# Patient Record
Sex: MALE | Race: WHITE | Employment: STUDENT | URBAN - METROPOLITAN AREA
[De-identification: names, ages, dates, MRNs, and addresses within clinical notes are randomized per-mention and may not be internally consistent; named-entity substitution may affect disease eponyms.]

---

## 2017-04-10 ENCOUNTER — OFFICE VISIT (OUTPATIENT)
Dept: PEDIATRICS | Facility: CLINIC | Age: 22
End: 2017-04-10
Payer: COMMERCIAL

## 2017-04-10 VITALS
TEMPERATURE: 98.1 F | SYSTOLIC BLOOD PRESSURE: 118 MMHG | WEIGHT: 267.25 LBS | OXYGEN SATURATION: 96 % | BODY MASS INDEX: 34.3 KG/M2 | HEART RATE: 80 BPM | DIASTOLIC BLOOD PRESSURE: 62 MMHG | RESPIRATION RATE: 18 BRPM | HEIGHT: 74 IN

## 2017-04-10 DIAGNOSIS — R79.89 ELEVATED TSH: ICD-10-CM

## 2017-04-10 DIAGNOSIS — F41.8 MIXED ANXIETY DEPRESSIVE DISORDER: Primary | ICD-10-CM

## 2017-04-10 DIAGNOSIS — E66.9 OBESITY, UNSPECIFIED OBESITY SEVERITY, UNSPECIFIED OBESITY TYPE: ICD-10-CM

## 2017-04-10 PROCEDURE — 99214 OFFICE O/P EST MOD 30 MIN: CPT | Performed by: SPECIALIST

## 2017-04-10 ASSESSMENT — ANXIETY QUESTIONNAIRES
1. FEELING NERVOUS, ANXIOUS, OR ON EDGE: SEVERAL DAYS
3. WORRYING TOO MUCH ABOUT DIFFERENT THINGS: MORE THAN HALF THE DAYS
GAD7 TOTAL SCORE: 8
7. FEELING AFRAID AS IF SOMETHING AWFUL MIGHT HAPPEN: SEVERAL DAYS
2. NOT BEING ABLE TO STOP OR CONTROL WORRYING: SEVERAL DAYS
6. BECOMING EASILY ANNOYED OR IRRITABLE: MORE THAN HALF THE DAYS
5. BEING SO RESTLESS THAT IT IS HARD TO SIT STILL: NOT AT ALL

## 2017-04-10 ASSESSMENT — PATIENT HEALTH QUESTIONNAIRE - PHQ9: 5. POOR APPETITE OR OVEREATING: SEVERAL DAYS

## 2017-04-10 NOTE — MR AVS SNAPSHOT
"              After Visit Summary   4/10/2017    Morgan Coleman    MRN: [id number]           Patient Information     Date Of Birth          1995        Visit Information        Provider Department      4/10/2017 8:20 AM Chastity Condon MD [facility]        Today's Diagnoses     Mixed anxiety depressive disorder    -  1    Obesity, unspecified obesity severity, unspecified obesity type        Elevated TSH          Care Instructions    Wt Readings from Last 5 Encounters:   04/10/17 267 lb 4 oz (121.2 kg)   09/21/16 263 lb 9.6 oz (119.6 kg) (>99 %)*   10/30/15 252 lb 6.4 oz (114.5 kg) (>99 %)*   09/29/15 261 lb 4.8 oz (118.5 kg) (>99 %)*   02/19/15 242 lb 6.4 oz (110 kg) (>99 %)*     * Growth percentiles are based on [facility] 2-20 Years data.     Selective Serotonin Reuptake Inhibitors, or \"SSRIs\" are a group of medications that can help treat depression but are also helpful for anxiety. SSRIs alter the level of the neurotransmitter serotonin in the brain, which helps the brain cells communicate with one another.   Fluoxetine (Prozac), Sertraline (Zoloft), Citalopram (Celexa) and Escitalopram (Lexapro) are a few of the more common SSRIs. These medications are generally well tolerated but can produce some side effects when people first start to take and they usually fade over time. These can include some jitteriness, nausea, fatigue or sleep disturbance. If these side effects do not diminish with time or are bothersome, please call us. Occasionally they can be more severe, with increase irritability, earlene, worsening depression or feelings of want to harm oneself. If these should occur, you should call right away.   FDA Black Box warning- increased risk of suicide thinking but not in actual suicides.   Would recommend starting Prozac 20 mg daily. Will likely need higher dose.   Side effects can be minimized by starting at a low dose and gradually increase dose as needed. It can take 4-6 weeks " "before symptoms really start to improve.   Follow up visits are required within 3-4 weeks of starting medication and then will be need to be monitored regularly.   Will need recheck before any refills.   Would take daily Vitamin D supplements as well.           Follow-ups after your visit        Who to contact     If you have questions or need follow up information about today's clinic visit or your schedule please contact [facility] directly at [phone].  Normal or non-critical lab and imaging results will be communicated to you by [employer], letter or phone within 4 business days after the clinic has received the results. If you do not hear from us within 7 days, please contact the clinic through [employer] or phone. If you have a critical or abnormal lab result, we will notify you by phone as soon as possible.  Submit refill requests through [employer] or call your pharmacy and they will forward the refill request to us. Please allow 3 business days for your refill to be completed.          Additional Information About Your Visit        Listen [employer] Information     [employer] lets you send messages to your doctor, view your test results, renew your prescriptions, schedule appointments and more. To sign up, go to [email or web address] . Click on \"Log in\" on the left side of the screen, which will take you to the Welcome page. Then click on \"Sign up Now\" on the right side of the page.     You will be asked to enter the access code listed below, as well as some personal information. Please follow the directions to create your username and password.     Your access code is: [email or web address]  Expires: 2017  9:10 AM     Your access code will  in 90 days. If you need help or a new code, please call your [place] clinic or [phone].        Care EveryWhere ID     This is your Care EveryWhere ID. This could be used by other organizations to access your [place] medical records  [id number]        Your " "Vitals Were     Pulse Temperature Respirations Height Pulse Oximetry BMI (Body Mass Index)    80 98.1  F (36.7  C) (Tympanic) 18 6' 2.25\" (1.886 m) 96% 34.08 kg/m2       Blood Pressure from Last 3 Encounters:   04/10/17 118/62   09/21/16 102/62   10/30/15 106/64    Weight from Last 3 Encounters:   04/10/17 267 lb 4 oz (121.2 kg)   09/21/16 263 lb 9.6 oz (119.6 kg) (>99 %)*   10/30/15 252 lb 6.4 oz (114.5 kg) (>99 %)*     * Growth percentiles are based on [facility] 2-20 Years data.              Today, you had the following     No orders found for display         Today's Medication Changes          These changes are accurate as of: 4/10/17  9:10 AM.  If you have any questions, ask your nurse or doctor.               Start taking these medicines.        Dose/Directions    FLUoxetine 20 MG capsule   Commonly known as:  PROzac   Used for:  Mixed anxiety depressive disorder   Started by:  Chastity Condon MD        Dose:  20 mg   Take 1 capsule (20 mg) by mouth daily Recheck before any refills.   Quantity:  30 capsule   Refills:  0            Where to get your medicines      [facility] [address] [place] [id number]    Hours:  24-hours Phone:  [phone]     FLUoxetine 20 MG capsule                Primary Care Provider Office Phone # Fax #    Chastity Ambrocio [phone]       [facility] [address] [facility] [address]        Thank you!     Thank you for choosing [facility]  for your care. Our goal is always to provide you with excellent care. Hearing back from our patients is one way we can continue to improve our services. Please take a few minutes to complete the written survey that you may receive in the mail after your visit with us. Thank you!             Your Updated Medication List - Protect others around you: Learn how " to safely use, store and throw away your medicines at www.disposemymeds.org.          This list is accurate as of: 4/10/17  9:10 AM.  Always use your most recent med list.                   Brand Name Dispense Instructions for use    FLUoxetine 20 MG capsule    PROzac    30 capsule    Take 1 capsule (20 mg) by mouth daily Recheck before any refills.

## 2017-04-10 NOTE — PROGRESS NOTES
"  SUBJECTIVE:                                                    Morgan Coleman is a 21 year old male who presents to clinic today for the following health issues:      Depression   History of depression and ED visit with thoughts of self-harm 3/12- took Prozac from 3/12 thru fall 2012.     Status since last visit: Worsened     See PHQ-9 for current symptoms.  Other associated symptoms: None    Complicating factors:   Significant life event:  Yes-  Graduated College in May 2016 and is looking for a job   Current substance abuse:  None  Anxiety or Panic symptoms:  Yes-  Anxiety    PHQ-9  English PHQ-9   Any Language             Amount of exercise or physical activity: 2-3 days/week for an average of 45-60 minutes with a     Problems taking medications regularly: No    Medication side effects: none    Diet: regular (no restrictions)    Exercise: Goes to [employer] and sees a  twice weekly; LARP (live action role-playing game)    Sleep: Mom thinks that he has no problems falling asleep. Patient disagrees with this and thinks that he takes a long time to fall into deep sleep. Mom mentions he had restless leg syndrome as a child (legs were \"twitchy\") but patient was unaware of this and states that he has had no symptoms of this that he can recall.    Patient says that everything was okay until he got his first job. 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 It was 60 hours/week for 6 week period and didn't feel like he had adequate training.  did not tell workplace about his health issues. Mother states that he was crying and had a \"complete breakdown\" which he describes as a panic attack. Also, mom says that he said that \"he doesn't belong in this world\". Now, he has a  from a placement agency so he is looking for a part-time job. Mom says that there aren't any part-time welding jobs. All jobs are full-time with a lot of overtime required. He graduated from [employer] in May " "2016 and hasn't had success since then. Still working with same . Has a meeting this week with a new . Patient is considering [employer].     Also mentions that he has social anxiety. He plays guitar for a band and once was \"almost in tears\" when he had to play. He goes to a karon group on Sundays which he enjoys. However, mom says that he avoids \"new things\" and plays video games at home all day. He talks to people virtually through games but not face-to-face. Mom also mentions that he is very irritable.     Patient doesn't recall how he was feeling on Prozac in 2012.     Mom says that he is planning on seeing Mari.    Of note, mom mentions that she wants to revisit idea of seizures, but haven't seen any signs/symptoms of seizures. Mom hasn't noticed any abnormal eye movements or facial symptoms. EEG done in the 2006 and 2007 were normal. However, she states that he \"spaces out\" and is \"so off\" sometimes. She discussed this with her therapist who is not accepting new patients.         PROBLEMS TO ADD ON...  Mom mentions that she was reviewing medical records which noted a low T4. He had labs done on 9/21/16. TSH was 1.53 and T4 was 1.01 (normal). Also mentions that he was taking a vitamin D supplement but stopped taking it recently.   Patient states that he has always had a patch of area without hair below chin which is why he wants thyroid checked. It has always been like this. Father also has patches of skin without hair.       Family Hx  Mom- taking Cymbalta for depression. Took prozac in the past which didn't help with her fibromyalgia. Wellbutrin and Zoloft(visual disturbance) gave her headaches.   Father- taking Lexapro for depression and anxiety    Problem list and histories reviewed & adjusted, as indicated.  Additional history: as documented    Patient Active Problem List   Diagnosis     PDD (pervasive developmental disorder), active     Learning disability     Language disorder " "involving understanding and expression of language     ADHD, predominantly inattentive type     Seasonal allergic rhinitis     Tinnitus     Auditory processing disorder     Vitamin D deficiency     Family history of coronary artery disease, Normal Lipid Panel 2008     Health Care Home     Obesity     Mixed anxiety depressive disorder     Past Surgical History:   Procedure Laterality Date     ADENOIDECTOMY  1999     EXCISION OF NAIL FOLD, TOE  12/11    Dr. Haines- right toe     PE TUBES  1997 & 1999    X2     TONSILLECTOMY  4/09       Social History   Substance Use Topics     Smoking status: Never Smoker     Smokeless tobacco: Never Used     Alcohol use No     Family History   Problem Relation Age of Onset     Cardiovascular Paternal Grandmother 70     heart      Depression Mother      fibromyalgia     Obesity Mother      Depression Father      Anxiety Disorder Father            Reviewed and updated as needed this visit by clinical staff  Tobacco  Allergies  Meds  Problems  Med Hx  Surg Hx  Fam Hx  Soc Hx        Reviewed and updated as needed this visit by Provider  Meds  Fam Hx         ROS:  C: NEGATIVE for fever, chills, change in weight  E/M: NEGATIVE for ear, mouth and throat problems  R: NEGATIVE for significant cough or SOB  CV: NEGATIVE for chest pain, palpitations or peripheral edema  PSYCHIATRIC: POSITIVE for depression, anxiety and panic attack    This document serves as a record of the services and decisions personally performed and made by Chastity Ambrocio MD. It was created on his/her behalf by Ramon Brower, a trained medical scribe. The creation of this document is based the provider's statements to the medical scribe.  Scribcarlos Brower 8:38 AM, April 10, 2017    OBJECTIVE:                                                    /62 (BP Location: Right arm, Patient Position: Chair, Cuff Size: Adult Regular)  Pulse 80  Temp 98.1  F (36.7  C) (Tympanic)  Resp 18  Ht 6' 2.25\" (1.886 m) " " Wt 267 lb 4 oz (121.2 kg)  SpO2 96%  BMI 34.08 kg/m2  Body mass index is 34.08 kg/(m^2).  GENERAL:  Alert and interactive  EYES:  Normal extra-ocular movements.  PERRLA  EARS: Normal  PHARYNX: Normal  NECK: No adenopathy, no thyroid enlargement.   LUNGS:  Clear  HEART:  Normal rate and rhythm.  Normal S1 and S2.  No murmurs.  NEURO:  No tics or tremor.  Normal tone and strength. Normal gait and balance.      Diagnostic Test Results:  none      ASSESSMENT/PLAN:                                                            BMI:   Estimated body mass index is 34.08 kg/(m^2) as calculated from the following:    Height as of this encounter: 6' 2.25\" (1.886 m).    Weight as of this encounter: 267 lb 4 oz (121.2 kg).   Weight management plan: Discussed healthy diet and exercise guidelines and patient will follow up in 1 month in clinic to re-evaluate.      1. Mixed anxiety depressive disorder  PHQ-9 SCORE 2/25/2013 6/11/2013 4/10/2017   Total Score 8 7 -   Total Score - - 12     ISAMAR-7 SCORE 1/24/2012 5/29/2012 4/10/2017   Total Score 5 4 -   Total Score - - 8       We spent a significant amount of time today discussing patient's depression/anxiety which seems to have worsened with job search. Given his history of ASD, explained that social anxiety could be attributed to this as well. Strong family history. Patient and mother were unable to recall any issues with fluoxetine in past so decided to restart this. Will start with 20 mg daily, but explained that we may need to increase dose so important to return in one month for med check.   - FLUoxetine (PROZAC) 20 MG capsule; Take 1 capsule (20 mg) by mouth daily Recheck before any refills.  Dispense: 30 capsule; Refill: 0    Mom also had concerns about seizures. Episodes of inattention have been long standing.  Given patient's normal EEG in 2006 and 2007 and the fact that he has no signs or symptoms indicating seizures, likely not concerned about this. However, did offer a " referral to a neurologist. Mother declined.       2. Obesity, unspecified obesity severity, unspecified obesity type  Discussion re: eating habits, exercise, risk of heart disease, HTN give wt and previous elevated TG.     3. Elevated TSH  TSH and T4 were normal last September. Explained that we normally would not recheck this until it has been at least 1 year since last labs.   Encouraged patient to restart vitamin D supplement.         Follow-up: 1 month for med check with starting new meds.     The information in this document, created by the medical scribe for me, accurately reflects the services I personally performed and the decisions made by me. I have reviewed and approved this document for accuracy prior to leaving the patient care area.  Chastity Ambrocio MD  9:16 AM, 04/10/17    Chastity Ambrocio MD  [facility]

## 2017-04-10 NOTE — NURSING NOTE
"Chief Complaint   Patient presents with     Depression       Initial /62 (BP Location: Right arm, Patient Position: Chair, Cuff Size: Adult Regular)  Pulse 80  Temp 98.1  F (36.7  C) (Tympanic)  Resp 18  Ht 6' 2.25\" (1.886 m)  Wt 267 lb 4 oz (121.2 kg)  SpO2 96%  BMI 34.08 kg/m2 Estimated body mass index is 34.08 kg/(m^2) as calculated from the following:    Height as of this encounter: 6' 2.25\" (1.886 m).    Weight as of this encounter: 267 lb 4 oz (121.2 kg).  Medication Reconciliation: complete     Amelia Cristobal CMA      "

## 2017-04-10 NOTE — PATIENT INSTRUCTIONS
"Wt Readings from Last 5 Encounters:   04/10/17 267 lb 4 oz (121.2 kg)   09/21/16 263 lb 9.6 oz (119.6 kg) (>99 %)*   10/30/15 252 lb 6.4 oz (114.5 kg) (>99 %)*   09/29/15 261 lb 4.8 oz (118.5 kg) (>99 %)*   02/19/15 242 lb 6.4 oz (110 kg) (>99 %)*     * Growth percentiles are based on [facility] 2-20 Years data.     Selective Serotonin Reuptake Inhibitors, or \"SSRIs\" are a group of medications that can help treat depression but are also helpful for anxiety. SSRIs alter the level of the neurotransmitter serotonin in the brain, which helps the brain cells communicate with one another.   Fluoxetine (Prozac), Sertraline (Zoloft), Citalopram (Celexa) and Escitalopram (Lexapro) are a few of the more common SSRIs. These medications are generally well tolerated but can produce some side effects when people first start to take and they usually fade over time. These can include some jitteriness, nausea, fatigue or sleep disturbance. If these side effects do not diminish with time or are bothersome, please call us. Occasionally they can be more severe, with increase irritability, earlene, worsening depression or feelings of want to harm oneself. If these should occur, you should call right away.   FDA Black Box warning- increased risk of suicide thinking but not in actual suicides.   Would recommend starting Prozac 20 mg daily. Will likely need higher dose.   Side effects can be minimized by starting at a low dose and gradually increase dose as needed. It can take 4-6 weeks before symptoms really start to improve.   Follow up visits are required within 3-4 weeks of starting medication and then will be need to be monitored regularly.   Will need recheck before any refills.   Would take daily Vitamin D supplements as well.     "

## 2017-04-11 ASSESSMENT — PATIENT HEALTH QUESTIONNAIRE - PHQ9: SUM OF ALL RESPONSES TO PHQ QUESTIONS 1-9: 12

## 2017-04-11 ASSESSMENT — ANXIETY QUESTIONNAIRES: GAD7 TOTAL SCORE: 8

## 2017-05-08 ENCOUNTER — OFFICE VISIT (OUTPATIENT)
Dept: PEDIATRICS | Facility: CLINIC | Age: 22
End: 2017-05-08
Payer: COMMERCIAL

## 2017-05-08 VITALS
SYSTOLIC BLOOD PRESSURE: 118 MMHG | HEART RATE: 70 BPM | TEMPERATURE: 97.2 F | RESPIRATION RATE: 20 BRPM | WEIGHT: 263.13 LBS | BODY MASS INDEX: 33.77 KG/M2 | DIASTOLIC BLOOD PRESSURE: 80 MMHG | OXYGEN SATURATION: 96 % | HEIGHT: 74 IN

## 2017-05-08 DIAGNOSIS — F90.0 ADHD, PREDOMINANTLY INATTENTIVE TYPE: Primary | ICD-10-CM

## 2017-05-08 DIAGNOSIS — F41.8 MIXED ANXIETY DEPRESSIVE DISORDER: ICD-10-CM

## 2017-05-08 PROCEDURE — 99214 OFFICE O/P EST MOD 30 MIN: CPT | Performed by: SPECIALIST

## 2017-05-08 RX ORDER — DEXTROAMPHETAMINE SACCHARATE, AMPHETAMINE ASPARTATE MONOHYDRATE, DEXTROAMPHETAMINE SULFATE AND AMPHETAMINE SULFATE 2.5; 2.5; 2.5; 2.5 MG/1; MG/1; MG/1; MG/1
10 CAPSULE, EXTENDED RELEASE ORAL DAILY
Qty: 30 CAPSULE | Refills: 0 | Status: SHIPPED | OUTPATIENT
Start: 2017-05-08 | End: 2017-06-21

## 2017-05-08 ASSESSMENT — ANXIETY QUESTIONNAIRES
6. BECOMING EASILY ANNOYED OR IRRITABLE: MORE THAN HALF THE DAYS
2. NOT BEING ABLE TO STOP OR CONTROL WORRYING: SEVERAL DAYS
1. FEELING NERVOUS, ANXIOUS, OR ON EDGE: NOT AT ALL
7. FEELING AFRAID AS IF SOMETHING AWFUL MIGHT HAPPEN: NOT AT ALL
3. WORRYING TOO MUCH ABOUT DIFFERENT THINGS: SEVERAL DAYS
GAD7 TOTAL SCORE: 4
5. BEING SO RESTLESS THAT IT IS HARD TO SIT STILL: NOT AT ALL

## 2017-05-08 ASSESSMENT — PATIENT HEALTH QUESTIONNAIRE - PHQ9: 5. POOR APPETITE OR OVEREATING: NOT AT ALL

## 2017-05-08 NOTE — NURSING NOTE
"Chief Complaint   Patient presents with     Anxiety     Depression     Recheck Medication       Initial /80 (BP Location: Right arm, Patient Position: Chair, Cuff Size: Adult Regular)  Pulse 70  Temp 97.2  F (36.2  C) (Tympanic)  Resp 20  Ht 6' 2.25\" (1.886 m)  Wt 263 lb 2 oz (119.4 kg)  SpO2 96%  BMI 33.56 kg/m2 Estimated body mass index is 33.56 kg/(m^2) as calculated from the following:    Height as of this encounter: 6' 2.25\" (1.886 m).    Weight as of this encounter: 263 lb 2 oz (119.4 kg).  Medication Reconciliation: complete     Amelia Cristobal CMA      "

## 2017-05-08 NOTE — PATIENT INSTRUCTIONS
Wt Readings from Last 5 Encounters:   05/08/17 263 lb 2 oz (119.4 kg)   04/10/17 267 lb 4 oz (121.2 kg)   09/21/16 263 lb 9.6 oz (119.6 kg) (>99 %)*   10/30/15 252 lb 6.4 oz (114.5 kg) (>99 %)*   09/29/15 261 lb 4.8 oz (118.5 kg) (>99 %)*     * Growth percentiles are based on [facility] 2-20 Years data.     Will keep Prozac at 20 mg.   Starting ADHD medication:  Would recommend starting with stimulant medication. There are non-stimulant alternatives that would be considered if stimulants are not effective or if side effects are not tolerable. Most common side effects from stimulants include appetite reduction, weight loss, sleep difficulties, irritability, rebound effect as medication wears off, sedation, motor tics, emotional lability, headaches and stomachaches. To minimize side effects, we would normally start with lowest dose.   Would recommend you start with Adderall XR 10 mg.   After 3-5 days if not working very well but not having any side-effects from medications, then go ahead and increase the dose to 20 mg.   If you feel any further adjustments in dosing are needed before the recheck then please call.

## 2017-05-08 NOTE — PROGRESS NOTES
"  SUBJECTIVE:                                                    Morgan Coleman is a 21 year old male who presents to clinic today for the following health issues:        Depression and Anxiety Follow-Up    Last clinic visit: 4/10/17- Started on 20 mg of Prozac once daily.    Status since last visit: Improved. Anxiety has improved; patient states that it is not a problem anymore. Still a little irritable but attributes this to reactions to his family. Overall is happier    Mom and patient have concerns about possible inattention; this was discussed at last visit. Patient states that he has been \"spacing\" out. Diagnosed with ADHD when young but had done more brain training and had wanted to avoid stimulants. Mom is concerned about difficulty with filling prescription of stimulant and being able to take this on trips but willing to consider them.     Other associated symptoms: None    Side effects: None.     Sleep: No change. Takes a long time to fall asleep.     Physical activity: States he has been working out more often.     Complicating factors:     Significant life event: No     Current substance abuse: None    PHQ-9 SCORE 6/11/2013 4/10/2017 5/8/2017   Total Score 7 - -   Total Score - 12 9     ISAMAR-7 SCORE 5/29/2012 4/10/2017 5/8/2017   Total Score 4 - -   Total Score - 8 4        PHQ-9  English      PHQ-9   Any Language     GAD7       Amount of exercise or physical activity: 2-3 days/week for an average of 45-60 minutes with a  and does LARP (Live action Role-Playing)    Problems taking medications regularly: No    Medication side effects: none    Diet: regular (no restrictions)    Family Hx  Mom- taking Cymbalta for depression. Took prozac in the past which didn't help with her fibromyalgia. Wellbutrin and Zoloft(visual disturbance) gave her headaches.   Father- taking Lexapro for depression and anxiety. Recently diagnosed with form of autism also.  Also has ADHD- treated with a medication that has " dexamphetamine. Complaining of memory issues which is likely related to ADHD.     Problem list and histories reviewed & adjusted, as indicated.  Additional history: as documented    Patient Active Problem List   Diagnosis     PDD (pervasive developmental disorder), active     Learning disability     Language disorder involving understanding and expression of language     ADHD, predominantly inattentive type     Seasonal allergic rhinitis     Tinnitus     Auditory processing disorder     Vitamin D deficiency     Family history of coronary artery disease, Normal Lipid Panel 2008     Health Care Home     Obesity     Mixed anxiety depressive disorder     Past Surgical History:   Procedure Laterality Date     ADENOIDECTOMY  1999     EXCISION OF NAIL FOLD, TOE  12/11    Dr. Haines- right toe     PE TUBES  1997 & 1999    X2     TONSILLECTOMY  4/09       Social History   Substance Use Topics     Smoking status: Never Smoker     Smokeless tobacco: Never Used     Alcohol use No     Family History   Problem Relation Age of Onset     Cardiovascular Paternal Grandmother 70     heart      Depression Mother      fibromyalgia     Obesity Mother      Depression Father      Anxiety Disorder Father      Autism Spectrum Disorder Father      Attention Deficit Disorder Father      Bipolar Disorder Maternal Grandmother            Reviewed and updated as needed this visit by clinical staff  Tobacco  Allergies  Meds  Problems  Med Hx  Surg Hx  Fam Hx  Soc Hx        Reviewed and updated as needed this visit by Provider         ROS:  Constitutional, HEENT, cardiovascular, pulmonary, gi and gu systems are negative, except as otherwise noted.    This document serves as a record of the services and decisions personally performed and made by Chastity Ambrocio MD. It was created on his/her behalf by Ramon Brower, a trained medical scribe. The creation of this document is based the provider's statements to the medical scribe.  Scribe  "Ramon Brower 9:12 AM, May 8, 2017    OBJECTIVE:                                                    /80 (BP Location: Right arm, Patient Position: Chair, Cuff Size: Adult Regular)  Pulse 70  Temp 97.2  F (36.2  C) (Tympanic)  Resp 20  Ht 1.886 m (6' 2.25\")  Wt 119.4 kg (263 lb 2 oz)  SpO2 96%  BMI 33.56 kg/m2  Body mass index is 33.56 kg/(m^2).    GENERAL:  Alert and interactive  Physical exam deferred today.       Diagnostic Test Results:  none      ASSESSMENT/PLAN:                                                      1. Mixed anxiety depressive disorder  ISAMAR-7 SCORE 5/29/2012 4/10/2017 5/8/2017   Total Score 4 - -   Total Score - 8 4     PHQ-9 SCORE 6/11/2013 4/10/2017 5/8/2017   Total Score 7 - -   Total Score - 12 9     Improved. No side effects from medication. Patient and mom would like to stay with current dose. Refill needed.   - FLUoxetine (PROZAC) 20 MG capsule; Take 1 capsule (20 mg) by mouth daily Recheck before any refills.  Dispense: 30 capsule; Refill: 3    2. ADHD, predominantly inattentive type  Had discussed last time that \"spells\" unlikely seizures. Had previous EEG that did not show seizures. Patient and mom mainly concerned about inattention at this point and would like to start medication. Previous evaluations confirmed diagnosis of ADD inattentive type for him. Father is likely taking Adderall and sibling (not biologically related) was taking Strattera.  Had a discussion about stimulants vs non-stimulants. Will start with Adderall XR 10 mg daily. If not helping after a week, but tolerating well, can try increasing to 20 mg after 3-5 days. Would like him to let me know if he does increase dose.   - amphetamine-dextroamphetamine (ADDERALL XR) 10 MG per 24 hr capsule; Take 1 capsule (10 mg) by mouth daily Med check before any refills.  Dispense: 30 capsule; Refill: 0    Follow Up: Med recheck in one month. If adjustments are made prior to recheck, will call.     See Patient " Instructions for further details.     The information in this document, created by the medical scribe for me, accurately reflects the services I personally performed and the decisions made by me. I have reviewed and approved this document for accuracy prior to leaving the patient care area.    9:25 AM, 05/08/17    Chastity Ambrocio MD  [facility]

## 2017-05-08 NOTE — MR AVS SNAPSHOT
After Visit Summary   5/8/2017    Morgan Coleman    MRN: [id number]           Patient Information     Date Of Birth          1995        Visit Information        Provider Department      5/8/2017 9:00 AM Chastity Condon MD [facility]        Today's Diagnoses     ADHD, predominantly inattentive type    -  1    Mixed anxiety depressive disorder          Care Instructions    Wt Readings from Last 5 Encounters:   05/08/17 263 lb 2 oz (119.4 kg)   04/10/17 267 lb 4 oz (121.2 kg)   09/21/16 263 lb 9.6 oz (119.6 kg) (>99 %)*   10/30/15 252 lb 6.4 oz (114.5 kg) (>99 %)*   09/29/15 261 lb 4.8 oz (118.5 kg) (>99 %)*     * Growth percentiles are based on [facility] 2-20 Years data.     Will keep Prozac at 20 mg.   Starting ADHD medication:  Would recommend starting with stimulant medication. There are non-stimulant alternatives that would be considered if stimulants are not effective or if side effects are not tolerable. Most common side effects from stimulants include appetite reduction, weight loss, sleep difficulties, irritability, rebound effect as medication wears off, sedation, motor tics, emotional lability, headaches and stomachaches. To minimize side effects, we would normally start with lowest dose.   Would recommend you start with Adderall XR 10 mg.   After 3-5 days if not working very well but not having any side-effects from medications, then go ahead and increase the dose to 20 mg.   If you feel any further adjustments in dosing are needed before the recheck then please call.          Follow-ups after your visit        Who to contact     If you have questions or need follow up information about today's clinic visit or your schedule please contact [facility] directly at [phone].  Normal or non-critical lab and imaging results will be communicated to you by MyChart, letter or phone within 4 business days after the clinic has received the results. If you do  "not hear from us within 7 days, please contact the clinic through [facility] or phone. If you have a critical or abnormal lab result, we will notify you by phone as soon as possible.  Submit refill requests through [facility] or call your pharmacy and they will forward the refill request to us. Please allow 3 business days for your refill to be completed.          Additional Information About Your Visit        [email or web address] Information     [facility] lets you send messages to your doctor, view your test results, renew your prescriptions, schedule appointments and more. To sign up, go to [email or web address] . Click on \"Log in\" on the left side of the screen, which will take you to the Welcome page. Then click on \"Sign up Now\" on the right side of the page.     You will be asked to enter the access code listed below, as well as some personal information. Please follow the directions to create your username and password.     Your access code is: [email or web address]  Expires: 2017  9:10 AM     Your access code will  in 90 days. If you need help or a new code, please call your [place] clinic or [phone].        Care EveryWhere ID     This is your Care EveryWhere ID. This could be used by other organizations to access your [place] medical records  [id number]        Your Vitals Were     Pulse Temperature Respirations Height Pulse Oximetry BMI (Body Mass Index)    70 97.2  F (36.2  C) (Tympanic) 20 6' 2.25\" (1.886 m) 96% 33.56 kg/m2       Blood Pressure from Last 3 Encounters:   17 118/80   04/10/17 118/62   16 102/62    Weight from Last 3 Encounters:   17 263 lb 2 oz (119.4 kg)   04/10/17 267 lb 4 oz (121.2 kg)   16 263 lb 9.6 oz (119.6 kg) (>99 %)*     * Growth percentiles are based on [facility] 2-20 Years data.              Today, you had the following     No orders found for display         Today's Medication Changes          These changes are accurate as of: 17  9:23 AM.  If you have any " questions, ask your nurse or doctor.               Start taking these medicines.        Dose/Directions    amphetamine-dextroamphetamine 10 MG per 24 hr capsule   Commonly known as:  ADDERALL XR   Used for:  ADHD, predominantly inattentive type   Started by:  Chastity Condon MD        Dose:  10 mg   Take 1 capsule (10 mg) by mouth daily Med check before any refills.   Quantity:  30 capsule   Refills:  0            Where to get your medicines      These medications were sent to [email or web address] [address] [facility] [address] AT [place] [address]  [address] [facility] [address]    Hours:  24-hours Phone:  [phone]     FLUoxetine 20 MG capsule         Some of these will need a paper prescription and others can be bought over the counter.  Ask your nurse if you have questions.     Bring a paper prescription for each of these medications     amphetamine-dextroamphetamine 10 MG per 24 hr capsule                Primary Care Provider Office Phone # Fax #    Chastity Ambrocio [phone]       [facility] [address] [facility] [address]        Thank you!     Thank you for choosing [facility]  for your care. Our goal is always to provide you with excellent care. Hearing back from our patients is one way we can continue to improve our services. Please take a few minutes to complete the written survey that you may receive in the mail after your visit with us. Thank you!             Your Updated Medication List - Protect others around you: Learn how to safely use, store and throw away your medicines at www.disposemymeds.org.          This list is accurate as of: 5/8/17  9:23 AM.  Always use your most recent med list.                   Brand Name Dispense Instructions for use    amphetamine-dextroamphetamine 10 MG per 24 hr capsule    ADDERALL XR    30 capsule    Take 1 capsule (10 mg) by mouth daily Med check before  any refills.       FLUoxetine 20 MG capsule    PROzac    30 capsule    Take 1 capsule (20 mg) by mouth daily Recheck before any refills.

## 2017-05-09 ASSESSMENT — ANXIETY QUESTIONNAIRES: GAD7 TOTAL SCORE: 4

## 2017-05-09 ASSESSMENT — PATIENT HEALTH QUESTIONNAIRE - PHQ9: SUM OF ALL RESPONSES TO PHQ QUESTIONS 1-9: 9

## 2017-05-22 ENCOUNTER — TELEPHONE (OUTPATIENT)
Dept: PEDIATRICS | Facility: CLINIC | Age: 22
End: 2017-05-22

## 2017-05-22 NOTE — TELEPHONE ENCOUNTER
Mom calls.  Pt hit his head yesterday.  One of his pupil is slightly larger than the other one, she is not sure if it was like that before.  He has a slight headache.  He hit the back of his head on a wooden floor.  He was playing a game and it was slippery and he fell.  He did not stop playing the game.  He came home and was tired and went right to bed.  He usually does this.  No confusion or nausea.  No LOC.  No dizziness or blurry vision.  No ringing in the ears.  She is concerned because he has autism and does not want any more brain injuries.      Advised to be seen.  Hours and locations of UC given.

## 2017-06-21 ENCOUNTER — OFFICE VISIT (OUTPATIENT)
Dept: PEDIATRICS | Facility: CLINIC | Age: 22
End: 2017-06-21
Payer: COMMERCIAL

## 2017-06-21 VITALS
TEMPERATURE: 98.4 F | HEIGHT: 74 IN | SYSTOLIC BLOOD PRESSURE: 118 MMHG | BODY MASS INDEX: 32.49 KG/M2 | RESPIRATION RATE: 20 BRPM | OXYGEN SATURATION: 97 % | HEART RATE: 85 BPM | DIASTOLIC BLOOD PRESSURE: 70 MMHG | WEIGHT: 253.19 LBS

## 2017-06-21 DIAGNOSIS — E66.9 OBESITY, UNSPECIFIED OBESITY SEVERITY, UNSPECIFIED OBESITY TYPE: ICD-10-CM

## 2017-06-21 DIAGNOSIS — F41.8 MIXED ANXIETY DEPRESSIVE DISORDER: ICD-10-CM

## 2017-06-21 DIAGNOSIS — F90.0 ADHD, PREDOMINANTLY INATTENTIVE TYPE: Primary | ICD-10-CM

## 2017-06-21 PROCEDURE — 99214 OFFICE O/P EST MOD 30 MIN: CPT | Performed by: SPECIALIST

## 2017-06-21 RX ORDER — DEXTROAMPHETAMINE SACCHARATE, AMPHETAMINE ASPARTATE, DEXTROAMPHETAMINE SULFATE AND AMPHETAMINE SULFATE 2.5; 2.5; 2.5; 2.5 MG/1; MG/1; MG/1; MG/1
10 TABLET ORAL 2 TIMES DAILY
Qty: 60 TABLET | Refills: 0 | Status: SHIPPED | OUTPATIENT
Start: 2017-06-21 | End: 2017-08-31

## 2017-06-21 NOTE — NURSING NOTE
"Chief Complaint   Patient presents with     A.D.H.D     Recheck Medication       Initial /70 (BP Location: Right arm, Patient Position: Chair, Cuff Size: Adult Regular)  Pulse 85  Temp 98.4  F (36.9  C) (Tympanic)  Resp 20  Ht 6' 2.25\" (1.886 m)  Wt 253 lb 3 oz (114.8 kg)  SpO2 97%  BMI 32.29 kg/m2 Estimated body mass index is 32.29 kg/(m^2) as calculated from the following:    Height as of this encounter: 6' 2.25\" (1.886 m).    Weight as of this encounter: 253 lb 3 oz (114.8 kg).  Medication Reconciliation: haja Cristobal CMA      "

## 2017-06-21 NOTE — MR AVS SNAPSHOT
"              After Visit Summary   6/21/2017    Morgan Coleman    MRN: [id number]           Patient Information     Date Of Birth          1995        Visit Information        Provider Department      6/21/2017 2:00 PM Chastity Condon MD [facility]        Today's Diagnoses     ADHD, predominantly inattentive type    -  1    Mixed anxiety depressive disorder          Care Instructions    Would want to see you back before need next refill- 1-2 mos depending on how you end up taking this.             Follow-ups after your visit        Who to contact     If you have questions or need follow up information about today's clinic visit or your schedule please contact [facility] directly at [phone].  Normal or non-critical lab and imaging results will be communicated to you by MyChart, letter or phone within 4 business days after the clinic has received the results. If you do not hear from us within 7 days, please contact the clinic through MyChart or phone. If you have a critical or abnormal lab result, we will notify you by phone as soon as possible.  Submit refill requests through [facility] or call your pharmacy and they will forward the refill request to us. Please allow 3 business days for your refill to be completed.          Additional Information About Your Visit        MyChart Information     [facility] lets you send messages to your doctor, view your test results, renew your prescriptions, schedule appointments and more. To sign up, go to [email or web address] . Click on \"Log in\" on the left side of the screen, which will take you to the Welcome page. Then click on \"Sign up Now\" on the right side of the page.     You will be asked to enter the access code listed below, as well as some personal information. Please follow the directions to create your username and password.     Your access code is: [email or web address]  Expires: 7/9/2017  9:10 AM     Your access code will " " in 90 days. If you need help or a new code, please call your [place] clinic or [phone].        Care EveryWhere ID     This is your Care EveryWhere ID. This could be used by other organizations to access your [place] medical records  [id number]        Your Vitals Were     Pulse Temperature Respirations Height Pulse Oximetry BMI (Body Mass Index)    85 98.4  F (36.9  C) (Tympanic) 20 6' 2.25\" (1.886 m) 97% 32.29 kg/m2       Blood Pressure from Last 3 Encounters:   17 118/70   17 118/80   04/10/17 118/62    Weight from Last 3 Encounters:   17 253 lb 3 oz (114.8 kg)   17 263 lb 2 oz (119.4 kg)   04/10/17 267 lb 4 oz (121.2 kg)              Today, you had the following     No orders found for display         Today's Medication Changes          These changes are accurate as of: 17  2:29 PM.  If you have any questions, ask your nurse or doctor.               Start taking these medicines.        Dose/Directions    amphetamine-dextroamphetamine 10 MG per tablet   Commonly known as:  ADDERALL   Used for:  ADHD, predominantly inattentive type   Replaces:  amphetamine-dextroamphetamine 10 MG per 24 hr capsule   Started by:  Chastity Condon MD        Dose:  10 mg   Take 1 tablet (10 mg) by mouth 2 times daily   Quantity:  60 tablet   Refills:  0         These medicines have changed or have updated prescriptions.        Dose/Directions    * FLUoxetine 20 MG capsule   Commonly known as:  PROzac   This may have changed:  Another medication with the same name was added. Make sure you understand how and when to take each.   Used for:  Mixed anxiety depressive disorder   Changed by:  Chastity Condon MD        Dose:  20 mg   Take 1 capsule (20 mg) by mouth daily Recheck before any refills.   Quantity:  30 capsule   Refills:  0       * FLUoxetine 20 MG capsule   Commonly known as:  PROzac   This may have changed:  You were already taking a medication with the same name, and " this prescription was added. Make sure you understand how and when to take each.   Used for:  Mixed anxiety depressive disorder   Changed by:  Chastity Condon MD        Dose:  20 mg   Take 1 capsule (20 mg) by mouth daily   Quantity:  90 capsule   Refills:  1       * Notice:  This list has 2 medication(s) that are the same as other medications prescribed for you. Read the directions carefully, and ask your doctor or other care provider to review them with you.      Stop taking these medicines if you haven't already. Please contact your care team if you have questions.     amphetamine-dextroamphetamine 10 MG per 24 hr capsule   Commonly known as:  ADDERALL XR   Replaced by:  amphetamine-dextroamphetamine 10 MG per tablet   Stopped by:  Chastity Condon MD                Where to get your medicines      These medications were sent to [facility] - [address], [facility] [address]     Phone:  [phone]     FLUoxetine 20 MG capsule         These medications [facility] to [facility] [address] [place] [address]  [id number] [place] [address]    Hours:  24-hours Phone:  [phone]     FLUoxetine 20 MG capsule         Some of these will need a paper prescription and others can be bought over the counter.  Ask your nurse if you have questions.     Bring a paper prescription for each of these medications     amphetamine-dextroamphetamine 10 MG per tablet                Primary Care Provider Office Phone # Fax #    Chastity Ambrocio [phone]       [facility] [address] [facility] [address]        Equal Access to Services     [facility]: Hadii aad ku hadasho Soomaali, waaxda luqadaha, qaybta kaalmada dejuan, raoul polanco. So [facility] [phone].    ATENCIÓN: lalita Alvarez  disposición servicios gratuitos de asistencia lingüística. Shayna addison [phone].    We comply with applicable federal civil rights laws and [place] laws. We do not discriminate on the basis of race, color, national origin, age, disability sex, sexual orientation or gender identity.            Thank you!     Thank you for choosing [facility]  for your care. Our goal is always to provide you with excellent care. Hearing back from our patients is one way we can continue to improve our services. Please take a few minutes to complete the written survey that you may receive in the mail after your visit with us. Thank you!             Your Updated Medication List - Protect others around you: Learn how to safely use, store and throw away your medicines at www.disposemymeds.org.          This list is accurate as of: 6/21/17  2:29 PM.  Always use your most recent med list.                   Brand Name Dispense Instructions for use Diagnosis    amphetamine-dextroamphetamine 10 MG per tablet    ADDERALL    60 tablet    Take 1 tablet (10 mg) by mouth 2 times daily    ADHD, predominantly inattentive type       * FLUoxetine 20 MG capsule    PROzac    30 capsule    Take 1 capsule (20 mg) by mouth daily Recheck before any refills.    Mixed anxiety depressive disorder       * FLUoxetine 20 MG capsule    PROzac    90 capsule    Take 1 capsule (20 mg) by mouth daily    Mixed anxiety depressive disorder       * Notice:  This list has 2 medication(s) that are the same as other medications prescribed for you. Read the directions carefully, and ask your doctor or other care provider to review them with you.

## 2017-08-31 DIAGNOSIS — F90.0 ADHD, PREDOMINANTLY INATTENTIVE TYPE: ICD-10-CM

## 2017-08-31 NOTE — TELEPHONE ENCOUNTER
Adderall      Last Written Prescription Date:  6/21/17  Last Fill Quantity: 60,   # refills: 0  Last Office Visit with [facility] prescribing provider: 6/21/17  Future Office visit:       Routing refill request to provider for review/approval because:  Drug not on the [facility] or [facility] refill protocol or controlled substance    Patient's mother would like to pick prescription up asap. Please call [phone] when ready.

## 2017-09-01 RX ORDER — DEXTROAMPHETAMINE SACCHARATE, AMPHETAMINE ASPARTATE, DEXTROAMPHETAMINE SULFATE AND AMPHETAMINE SULFATE 2.5; 2.5; 2.5; 2.5 MG/1; MG/1; MG/1; MG/1
10 TABLET ORAL 2 TIMES DAILY
Qty: 60 TABLET | Refills: 0 | Status: SHIPPED | OUTPATIENT
Start: 2017-09-01 | End: 2017-12-04

## 2017-09-27 ENCOUNTER — DOCUMENTATION ONLY (OUTPATIENT)
Dept: PEDIATRICS | Facility: CLINIC | Age: 22
End: 2017-09-27

## 2017-09-27 NOTE — PROGRESS NOTES
Please call and let know he will need either an office visit, an e-visit or phone visit before next refill of Adderall can be done.

## 2017-09-27 NOTE — PROGRESS NOTES
Pediatric Panel Management Review      Patient has the following on his problem list:      ADHD (ages 6-12)  Review Medication Prescription dates:              Is this the first RX date?   NO - When was the previous RX date?  8/31/17  (if more than 120 days then a 30 day follow up is still needed)  Review Quality Dashboard or scorecard for index dates for patient.      Summary:    Patient is due/failing the following:   ADHD Medication Check.    Action needed:   Routed to provider for review.    Type of outreach:    None, routed to provider for review    Questions for provider review:    None.                                                                                                                                    Amelia Cristobal [facility]     Chart routed to Provider .

## 2017-10-17 ENCOUNTER — OFFICE VISIT (OUTPATIENT)
Dept: PEDIATRICS | Facility: CLINIC | Age: 22
End: 2017-10-17
Payer: COMMERCIAL

## 2017-10-17 VITALS
DIASTOLIC BLOOD PRESSURE: 78 MMHG | SYSTOLIC BLOOD PRESSURE: 118 MMHG | HEIGHT: 74 IN | RESPIRATION RATE: 20 BRPM | HEART RATE: 66 BPM | OXYGEN SATURATION: 97 % | WEIGHT: 258.8 LBS | TEMPERATURE: 98.1 F | BODY MASS INDEX: 33.21 KG/M2

## 2017-10-17 DIAGNOSIS — F41.8 MIXED ANXIETY DEPRESSIVE DISORDER: ICD-10-CM

## 2017-10-17 DIAGNOSIS — Z23 NEED FOR PROPHYLACTIC VACCINATION AND INOCULATION AGAINST INFLUENZA: ICD-10-CM

## 2017-10-17 DIAGNOSIS — F90.0 ADHD, PREDOMINANTLY INATTENTIVE TYPE: Primary | ICD-10-CM

## 2017-10-17 PROCEDURE — 99214 OFFICE O/P EST MOD 30 MIN: CPT | Mod: 25 | Performed by: SPECIALIST

## 2017-10-17 PROCEDURE — 90686 IIV4 VACC NO PRSV 0.5 ML IM: CPT | Performed by: SPECIALIST

## 2017-10-17 PROCEDURE — 90471 IMMUNIZATION ADMIN: CPT | Performed by: SPECIALIST

## 2017-10-17 RX ORDER — FLUOXETINE 10 MG/1
10 CAPSULE ORAL DAILY
Qty: 30 CAPSULE | Refills: 2 | Status: SHIPPED | OUTPATIENT
Start: 2017-10-17 | End: 2018-01-10

## 2017-10-17 RX ORDER — DEXTROAMPHETAMINE SACCHARATE, AMPHETAMINE ASPARTATE MONOHYDRATE, DEXTROAMPHETAMINE SULFATE AND AMPHETAMINE SULFATE 3.75; 3.75; 3.75; 3.75 MG/1; MG/1; MG/1; MG/1
15 CAPSULE, EXTENDED RELEASE ORAL DAILY
Qty: 30 CAPSULE | Refills: 0 | Status: SHIPPED | OUTPATIENT
Start: 2017-10-17 | End: 2017-12-04

## 2017-10-17 ASSESSMENT — ENCOUNTER SYMPTOMS: NERVOUS/ANXIOUS: 1

## 2017-10-17 NOTE — PATIENT INSTRUCTIONS
Prozac- important to try to take regularly. This is a long acting medication. Will increase dose to 30 mg (take one of the 10 mg along with one of the 20 mg= 30 mg) each day at same time. No reason to divide up.     Adderall XR 15 mg- take in am and should last longer. If not taking until later in the day, take the Adderall 10 mg short acting instead.     Would use facial moisturizer like Cetaphil or Vanicream for dry patch on left eyelid.     Transition to an adult provider. Recommend scheduling check up in next few months.   Review Transition work sheet with parents.   See if they can help you get into MyChart to schedule appt, see your medical records, etc.   If you can't get in, there is help line you can call.   [facility]: Argelia Cook, Leonides Atwood or [facility]: Many Med/Peds providers who could see you  Would like you to let me know who you want to see so I can talk with them before your next appt.     Consider HPV series.

## 2017-10-17 NOTE — PROGRESS NOTES
SUBJECTIVE:                                                    Morgan Coleman is a 21 year old male who presents to clinic today with mother in ayan because of:    PHQ-2 Score: Incomplete  Chief Complaint   Patient presents with     Recheck Medication     A.D.H.D     Anxiety     Depression      Diet:  Regular (no restrictions)  Frequency of exercise:  2-3 days/week  Duration of exercise:  15-30 minutes  Taking medications regularly:  Yes  Medication side effects:  None  Additional concerns today:  No  Physical   Annual:     Getting at least 3 servings of Calcium per day::  NO    Bi-annual eye exam::  Yes    Dental care twice a year::  Yes    Sleep apnea or symptoms of sleep apnea::  Daytime drowsiness    Diet::  Regular (no restrictions)    Frequency of exercise::  2-3 days/week    Duration of exercise::  15-30 minutes    Taking medications regularly::  Yes    Medication side effects::  None    Additional concerns today::  No    Answers for HPI/ROS submitted by the patient on 10/17/2017     HPI  Depression/Anxiety Follow-Up  On Prozac 20 mg     Status since last visit: Rx helping anxiety but is wearing off by 6 pm, anxiety exacerbates when rx wears off. Forgets to take rx 1-3x weekly. Morgan d/c rx for 1 month because he didn't have a job and wasn't in school.    See PHQ-9 for current symptoms.    Other associated symptoms: None    Complicating factors:   Significant life event: No   Current substance abuse: None  Anxiety / Panic symptoms: Yes   PHQ-9  English PHQ-9   Any Language        ADHD Follow-Up  Started on Adderall XR 5/17  Date of last ADHD office visit: 6/21/2017- switched to short acting Adderall as he thought XR was lasting longer than needed.   Status since last visit: Improving but not lasting long enough now that Fransisca is taking classes again- falls asleep in last class.  Taking controlled (daily) medications as prescribed: Yes.                                                                             ADHD Medication     Amphetamines Disp Start End    amphetamine-dextroamphetamine (ADDERALL) 10 MG per tablet 60 tablet 9/1/2017     Sig - Route: Take 1 tablet (10 mg) by mouth 2 times daily Med check before further refills. 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   Sleep: Improving  Activites: plays on computer, goes camping, runs. If he doesn't have anything to do he falls asleep.  Home/Family Concerns: None  Peer Concerns: None  Co-Morbid Diagnosis: PDD/ASD    ROS  Negative for constitutional, eye, ear, nose, throat, skin, respiratory, cardiac, and gastrointestinal other than those outlined in the HPI.    PROBLEM LIST  Patient Active Problem List    Diagnosis Date Noted     Mixed anxiety depressive disorder 04/10/2017     Priority: Medium     3/12- 9/12 Prozac  4/17 restarted Prozac       Obesity 10/09/2014     Priority: Medium     Health Care Home 01/24/2012     Priority: Medium     See note 10/9/14 for care plan - summary of history       Seasonal allergic rhinitis 11/14/2011     Priority: Medium     11/08 Intradermal skin tests + Allergy to dust mites, cats, grass, pollen; sublingual immunotherapy [id number] intermittent use; [facility]. [facility] in [place]       Tinnitus 11/14/2011     Priority: Medium     Audiogram and ENT evaluation felt to be within normal 9/08- Dr. Chavarria @ [facility].        Auditory processing disorder 11/14/2011     Priority: Medium     9/02 [facility]       Vitamin D deficiency 11/14/2011     Priority: Medium     Level 27; 8/13: 3/11 Vit D 14, B12 484,  Vit A 0.43, Retinyl Palmitate 0.04, Zinc 71, Ferritin 20, Ceruloplasmin 22, Copper 122, nl CBC, TSH 2.21, Free T3 4.1, Free T4 0.89       Family history of coronary artery disease, Normal Lipid Panel 2008 11/14/2011     Priority: Medium     Chol 151, TG 51, LDL 93, HDL 48       PDD  (pervasive developmental disorder), active      Priority: Medium     Followed DAN (Defeat Autism Now) Protocols thru Dr. Walker in [place], Dr. Taya Stockton in [place], Mercury chelation therapy with [facility] 7/01;  Dr. Axel Bernal @ [facility]; [facility]MS[facility] Chelation therapy, B12 shots, Vitamin Supplements, [place] Thyroid supplement; Gluten/ Casein- free diet; [facility] Eval 8/05; [facility] Social Skills Therapy, Music therapy, [facility]       Learning disability      Priority: Medium     Brain MRI normal 8/06; Auditory Processing Therapy, Attention Training Therapy, Vision Therapy @ [facility]- 26 weeks 12/06       Language disorder involving understanding and expression of language      Priority: Medium     2/05 Dr. Joaquin- Primarily Expressive Language; 8/05 [facility]. Has done speech therapy at [facility]       ADHD, predominantly inattentive type      Priority: Medium     EEG normal 8/06 & 10/07 for Brain training 6/08 (27 sessions)  5/17- Adderall        MEDICATIONS  Current Outpatient Prescriptions   Medication Sig Dispense Refill     amphetamine-dextroamphetamine (ADDERALL) 10 MG per tablet Take 1 tablet (10 mg) by mouth 2 times daily Med check before further refills. 60 tablet 0     FLUoxetine (PROZAC) 20 MG capsule Take 1 capsule (20 mg) by mouth daily Recheck before any refills. 30 capsule 0     [DISCONTINUED] FLUoxetine (PROZAC) 20 MG capsule Take 1 capsule (20 mg) by mouth daily 90 capsule 1      ALLERGIES  No Known Allergies    Reviewed and updated as needed this visit by clinical staff  Tobacco  Allergies  Meds  Problems  Med Hx  Surg Hx  Fam Hx  Soc Hx        Reviewed and updated as needed this visit by Provider  Allergies  Meds  Problems        This document serves as a record of the services and decisions personally performed and made by Chastity Ambrocio MD. It was created on her behalf by Leonid Zamora, a trained medical scribe. The creation of this  "document is based the provider's statements to the medical scribe.  Scribcarlos Zamora 2:12 PM, October 17, 2017    OBJECTIVE:                                                    /78 (BP Location: Right arm, Patient Position: Chair, Cuff Size: Adult Regular)  Pulse 66  Temp 98.1  F (36.7  C) (Tympanic)  Resp 20  Ht 1.88 m (6' 2\")  Wt 117.4 kg (258 lb 12.8 oz)  SpO2 97%  BMI 33.23 kg/m2  Normalized stature-for-age data not available for patients older than 20 years.  Normalized weight-for-age data not available for patients older than 20 years.  Normalized BMI data available only for age 0 to 20 years.  Normalized stature-for-age data not available for patients older than 20 years.    GENERAL:  Alert and interactive  SKIN: slightly dry patch on L eyelid  EYES:  Normal extra-ocular movements.  PERRLA  EARS: Normal  PHARYNX: Normal  NECK: No adenopathy, no thyroid enlargement.   LUNGS:  Clear  HEART:  Normal rate and rhythm.  Normal S1 and S2.  No murmurs.  NEURO:  No tics or tremor.  Normal tone and strength. Normal gait and balance.    DIAGNOSTICS: None    ASSESSMENT/PLAN:                                                    1. ADHD, predominantly inattentive type  Now that he is back in school, wants to go back to XR formulation but thinks he needs a little higher dosing.   Take in AM and should last longer. If not taking until later in the day, take the Adderall 10 mg short acting instead (still has some of these)  - amphetamine-dextroamphetamine (ADDERALL XR) 15 MG per 24 hr capsule; Take 1 capsule (15 mg) by mouth daily  Dispense: 30 capsule;   Only given one month as I would like him to send me a note as to how this is working and can refill this if ok for a couple more months.    2. Mixed anxiety depressive disorder  Having some anxiety and trouble controlling emotions- feels worse later in day. Discussed that this is a longer acting medication and not something to repeat again later in day, " "rather increase in dose might be helpful.   Important to try to take regularly. Will increase dose to 30 mg (take one of the 10 mg along with one of the 20 mg= 30 mg) each day at same time.      - FLUoxetine (PROZAC) 20 MG capsule; Take 1 capsule (20 mg) by mouth daily  Dispense: 30 capsule; Refill: 2  - FLUoxetine (PROZAC) 10 MG capsule; Take 1 capsule (10 mg) by mouth daily Take along with 10 mg= 30 mg  Dispense: 30 capsule; Refill: 2    3. Need for prophylactic vaccination and inoculation against influenza  Recommend HPV series.   - FLU VAC, SPLIT VIRUS IM > 3 YO (QUADRIVALENT) [62193]  - Vaccine Administration, Initial [31559]    4. Transition to adult provider  Readiness Assessment done  Rates Importance of managing own health as 8 (0-10) and confidence about his own ability as a 3.   Rated a number of things with using Health Care as \"things I need to learn\".   Recommend he discuss this with his parents to have them help assist him in some of these tasks, help him to log into [employer]- if problems.   Recommend several providers to transfer care for next check up that should occur in next 3 mos or so. Asked them to let me know who so I can help transmit info to next provider.     FOLLOW UP: with an adult provider in a few months for f/u    TIME SPENT: 30 minutes spent face to face with > 50% of the time spent counseling, advising and coordinating care.       The information in this document, created by the medical scribe for me, accurately reflects the services I personally performed and the decisions made by me. I have reviewed and approved this document for accuracy prior to leaving the patient care area.  2:41 PM, 10/17/17    Chastity Ambrocio MD     Injectable Influenza Immunization Documentation    1.  Is the person to be vaccinated sick today?   No    2. Does the person to be vaccinated have an allergy to a component   of the vaccine?   No    3. Has the person to be vaccinated ever had a serious reaction "   to influenza vaccine in the past?   No    4. Has the person to be vaccinated ever had Guillain-Barré syndrome?   No    Form completed by Amelia Cristobal CMA

## 2017-10-17 NOTE — PROGRESS NOTES
SUBJECTIVE:                                                      Morgan Coleman is a 21 year old male, here for a routine health maintenance visit.    Patient was roomed by: Chastity CARPENTER     Anxiety     Depression     Diet:  Regular (no restrictions)  Frequency of exercise:  2-3 days/week  Duration of exercise:  15-30 minutes  Taking medications regularly:  Yes  Medication side effects:  None  Additional concerns today:  No  Physical   Annual:     Getting at least 3 servings of Calcium per day::  NO    Bi-annual eye exam::  Yes    Dental care twice a year::  Yes    Sleep apnea or symptoms of sleep apnea::  Daytime drowsiness    Diet::  Regular (no restrictions)    Frequency of exercise::  2-3 days/week    Duration of exercise::  15-30 minutes    Taking medications regularly::  Yes    Medication side effects::  None    Additional concerns today::  No      {PEDS TEXT BY AGE:064173}  Answers for HPI/ROS submitted by the patient on 10/17/2017   PHQ-2 Score: Incomplete

## 2017-10-17 NOTE — MR AVS SNAPSHOT
After Visit Summary   10/17/2017    Morgan Coleman    MRN: [id number]           Patient Information     Date Of Birth          1995        Visit Information        Provider Department      10/17/2017 2:00 PM Chastity Condon MD [facility]        Today's Diagnoses     ADHD, predominantly inattentive type    -  1    Mixed anxiety depressive disorder        Need for prophylactic vaccination and inoculation against influenza          Care Instructions    Prozac- important to try to take regularly. This is a long acting medication. Will increase dose to 30 mg (take one of the 10 mg along with one of the 20 mg= 30 mg) each day at same time. No reason to divide up.     Adderall XR 15 mg- take in am and should last longer. If not taking until later in the day, take the Adderall 10 mg short acting instead.     Would use facial moisturizer like Cetaphil or Vanicream for dry patch on left eyelid.     Transition to an adult provider. Recommend scheduling check up in next few months.   Review Transition work sheet with parents.   See if [facility] can [facility], see your medical records, etc.   If you can't get in, there is help line you can call.   [facility]: Argelia Cook, Leonides Atwood or [facility]: Many Med/Peds providers who could see you  Would like you to let me know who you want to see so I can talk with them before your next appt.     Consider HPV series.             Follow-ups after your visit        Who to contact     If you have questions or need follow up information about today's clinic visit or your schedule please contact [facility] directly at [phone].  Normal or non-critical lab and imaging results will be communicated to you by MyChart, letter or phone within 4 business days after the clinic has received the results. If you do not hear from us within 7 days, please contact the clinic through  "MyChart or phone. If you have a critical or abnormal lab result, we will notify you by phone as soon as possible.  Submit refill requests through [employer] or call your pharmacy and they will forward the refill request to us. Please allow 3 business days for your refill to be completed.          Additional Information About Your Visit        [facility] Information     [employer] gives you secure access to your electronic health record. If you see a primary care provider, you can also send messages to your care team and make appointments. If you have questions, please call your primary care clinic.  If you do not have a primary care provider, please call [phone] and they will assist you.        Care EveryWhere ID     This is your Care EveryWhere ID. This could be used by other organizations to access your [facility] medical records  [id number]        Your Vitals Were     Pulse Temperature Respirations Height Pulse Oximetry BMI (Body Mass Index)    66 98.1  F (36.7  C) (Tympanic) 20 6' 2\" (1.88 m) 97% 33.23 kg/m2       Blood Pressure from Last 3 Encounters:   10/17/17 118/78   06/21/17 118/70   05/08/17 118/80    Weight from Last 3 Encounters:   10/17/17 258 lb 12.8 oz (117.4 kg)   06/21/17 253 lb 3 oz (114.8 kg)   05/08/17 263 lb 2 oz (119.4 kg)              We Performed the Following     FLU VAC, SPLIT VIRUS IM > 3 YO (QUADRIVALENT) [58708]     SCREENING QUESTIONS FOR PED IMMUNIZATIONS     Vaccine Administration, Initial [80815]          Today's Medication Changes          These changes are accurate as of: 10/17/17  2:38 PM.  If you have any questions, ask your nurse or doctor.               These medicines have changed or have updated prescriptions.        Dose/Directions    * amphetamine-dextroamphetamine 10 MG per tablet   Commonly known as:  ADDERALL   This may have changed:  Another medication with the same name was added. Make sure you understand how and when to take each.   Used for:  ADHD, predominantly " inattentive type   Changed by:  Chastity Condon MD        Dose:  10 mg   Take 1 tablet (10 mg) by mouth 2 times daily Med check before further refills.   Quantity:  60 tablet   Refills:  0       * amphetamine-dextroamphetamine 15 MG per 24 hr capsule   Commonly known as:  ADDERALL XR   This may have changed:  You were already taking a medication with the same name, and this prescription was added. Make sure you understand how and when to take each.   Used for:  ADHD, predominantly inattentive type   Changed by:  Chastity Condon MD        Dose:  15 mg   Take 1 capsule (15 mg) by mouth daily   Quantity:  30 capsule   Refills:  0       * FLUoxetine 20 MG capsule   Commonly known as:  PROzac   This may have changed:  Another medication with the same name was added. Make sure you understand how and when to take each.   Used for:  Mixed anxiety depressive disorder   Changed by:  Chastity Condon MD        Dose:  20 mg   Take 1 capsule (20 mg) by mouth daily Recheck before any refills.   Quantity:  30 capsule   Refills:  0       * FLUoxetine 20 MG capsule   Commonly known as:  PROzac   This may have changed:  You were already taking a medication with the same name, and this prescription was added. Make sure you understand how and when to take each.   Used for:  Mixed anxiety depressive disorder   Changed by:  Chastity Condon MD        Dose:  20 mg   Take 1 capsule (20 mg) by mouth daily   Quantity:  30 capsule   Refills:  2       * FLUoxetine 10 MG capsule   Commonly known as:  PROzac   This may have changed:  You were already taking a medication with the same name, and this prescription was added. Make sure you understand how and when to take each.   Used for:  Mixed anxiety depressive disorder   Changed by:  Chastity Condon MD        Dose:  10 mg   Take 1 capsule (10 mg) by mouth daily Take along with 10 mg= 30 mg   Quantity:  30 capsule   Refills:  2       * Notice:  This list  has 5 medication(s) that are the same as other medications prescribed for you. Read the directions carefully, and ask your doctor or other care provider to review them with you.         Where to get your medicines      These medications were sent to [facility] [address] [place] [address]  [address], [facility] [address]    Hours:  24-hours Phone:  [phone]     FLUoxetine 10 MG capsule    FLUoxetine 20 MG capsule         Some of these will need a paper prescription and others can be bought over the counter.  Ask your nurse if you have questions.     Bring a paper prescription for each of these medications     amphetamine-dextroamphetamine 15 MG per 24 hr capsule                Primary Care Provider Office Phone # Fax #    Chastity Crystal Ambrocio [phone] [address]  [facility] [address]        Equal Access to Services     [facility] : Hadii rachel rizo hadasho Soradha, waaxda luqadaha, qaybta kaalmada adeegyada, raoul castillo . So [facility] [phone].    ATENCIÓN: Si habla español, tiene a lipscomb disposición servicios gratuitos de asistencia lingüística. Shayna al [phone].    We comply with applicable federal civil rights laws and [place] laws. We do not discriminate on the basis of race, color, national origin, age, disability, sex, sexual orientation, or gender identity.            Thank you!     Thank you for choosing [facility]  for your care. Our goal is always to provide you with excellent care. Hearing back from our patients is one way we can continue to improve our services. Please take a few minutes to complete the written survey that you may receive in the mail after your visit with us. Thank you!             Your Updated Medication List - Protect others around you: Learn how to safely use, store and throw away your medicines at www.disposemymeds.org.           This list is accurate as of: 10/17/17  2:38 PM.  Always use your most recent med list.                   Brand Name Dispense Instructions for use Diagnosis    * amphetamine-dextroamphetamine 10 MG per tablet    ADDERALL    60 tablet    Take 1 tablet (10 mg) by mouth 2 times daily Med check before further refills.    ADHD, predominantly inattentive type       * amphetamine-dextroamphetamine 15 MG per 24 hr capsule    ADDERALL XR    30 capsule    Take 1 capsule (15 mg) by mouth daily    ADHD, predominantly inattentive type       * FLUoxetine 20 MG capsule    PROzac    30 capsule    Take 1 capsule (20 mg) by mouth daily Recheck before any refills.    Mixed anxiety depressive disorder       * FLUoxetine 20 MG capsule    PROzac    30 capsule    Take 1 capsule (20 mg) by mouth daily    Mixed anxiety depressive disorder       * FLUoxetine 10 MG capsule    PROzac    30 capsule    Take 1 capsule (10 mg) by mouth daily Take along with 10 mg= 30 mg    Mixed anxiety depressive disorder       * Notice:  This list has 5 medication(s) that are the same as other medications prescribed for you. Read the directions carefully, and ask your doctor or other care provider to review them with you.

## 2017-10-17 NOTE — NURSING NOTE
"Chief Complaint   Patient presents with     Recheck Medication     A.D.H.D     Anxiety     Depression       Initial /78 (BP Location: Right arm, Patient Position: Chair, Cuff Size: Adult Regular)  Pulse 66  Temp 98.1  F (36.7  C) (Tympanic)  Resp 20  Ht 6' 2\" (1.88 m)  Wt 258 lb 12.8 oz (117.4 kg)  SpO2 97%  BMI 33.23 kg/m2 Estimated body mass index is 33.23 kg/(m^2) as calculated from the following:    Height as of this encounter: 6' 2\" (1.88 m).    Weight as of this encounter: 258 lb 12.8 oz (117.4 kg).  Medication Reconciliation: complete     Amelia Cristobal CMA      "

## 2017-11-08 ENCOUNTER — TRANSFERRED RECORDS (OUTPATIENT)
Dept: HEALTH INFORMATION MANAGEMENT | Facility: CLINIC | Age: 22
End: 2017-11-08

## 2017-11-13 ENCOUNTER — DOCUMENTATION ONLY (OUTPATIENT)
Dept: PEDIATRICS | Facility: CLINIC | Age: 22
End: 2017-11-13

## 2017-11-13 NOTE — PROGRESS NOTES
Received form from [facility]. When done fax back to [phone].    In Dr. Jun Ambrocio's in basket to review.

## 2017-11-26 DIAGNOSIS — F41.8 MIXED ANXIETY DEPRESSIVE DISORDER: ICD-10-CM

## 2017-12-04 ENCOUNTER — TELEPHONE (OUTPATIENT)
Dept: PEDIATRICS | Facility: CLINIC | Age: 22
End: 2017-12-04

## 2017-12-04 DIAGNOSIS — F90.0 ADHD, PREDOMINANTLY INATTENTIVE TYPE: ICD-10-CM

## 2017-12-04 RX ORDER — DEXTROAMPHETAMINE SACCHARATE, AMPHETAMINE ASPARTATE MONOHYDRATE, DEXTROAMPHETAMINE SULFATE AND AMPHETAMINE SULFATE 3.75; 3.75; 3.75; 3.75 MG/1; MG/1; MG/1; MG/1
15 CAPSULE, EXTENDED RELEASE ORAL DAILY
Qty: 30 CAPSULE | Refills: 0 | Status: SHIPPED | OUTPATIENT
Start: 2017-12-04 | End: 2018-02-07

## 2017-12-04 RX ORDER — DEXTROAMPHETAMINE SACCHARATE, AMPHETAMINE ASPARTATE, DEXTROAMPHETAMINE SULFATE AND AMPHETAMINE SULFATE 2.5; 2.5; 2.5; 2.5 MG/1; MG/1; MG/1; MG/1
10 TABLET ORAL DAILY
Qty: 30 TABLET | Refills: 0 | Status: SHIPPED | OUTPATIENT
Start: 2017-12-04 | End: 2018-02-07

## 2017-12-04 RX ORDER — DEXTROAMPHETAMINE SACCHARATE, AMPHETAMINE ASPARTATE MONOHYDRATE, DEXTROAMPHETAMINE SULFATE AND AMPHETAMINE SULFATE 3.75; 3.75; 3.75; 3.75 MG/1; MG/1; MG/1; MG/1
15 CAPSULE, EXTENDED RELEASE ORAL DAILY
Qty: 30 CAPSULE | Refills: 0 | Status: SHIPPED | OUTPATIENT
Start: 2018-01-08 | End: 2018-03-07

## 2017-12-04 NOTE — TELEPHONE ENCOUNTER
Please put scripts at  and let them know they are ready.   At last visit was requesting switch to XR formulation. If misses it in am, then takes short acting. Given one month of 10 mg and 2 mos of XR 15 scripts.

## 2017-12-04 NOTE — TELEPHONE ENCOUNTER
Patients Mom wants to  today if possible. Please call when ready at the .    Amphetamine-dextroamphetamine (ADDERALL XR) 15 MG per 24 hr capsule        Last Written Prescription Date:  10/17/2017  Last Fill Quantity: 30,   # refills: 0  Last Office Visit: 10/17/2017  Future Office visit:       Routing refill request to provider for review/approval because:  Drug not on the [facility], P or  Health refill protocol or controlled substance    Amphetamine-dextroamphetamine (ADDERALL) 10 MG per tablet        Last Written Prescription Date:  09/01/2017  Last Fill Quantity: 60,   # refills: 0  Last Office Visit: 10/17/2017  Future Office visit:       Routing refill request to provider for review/approval because:  Drug not on the [facility], [facility] or [facility] refill protocol or controlled substance

## 2017-12-04 NOTE — TELEPHONE ENCOUNTER
Mom calls.  She is wondering if the scripts are ready.  Advised of below.  Scripts placed up front.

## 2018-01-10 DIAGNOSIS — F41.8 MIXED ANXIETY DEPRESSIVE DISORDER: ICD-10-CM

## 2018-01-10 RX ORDER — FLUOXETINE 10 MG/1
10 CAPSULE ORAL DAILY
Qty: 90 CAPSULE | Refills: 0 | Status: SHIPPED | OUTPATIENT
Start: 2018-01-10 | End: 2018-05-21

## 2018-01-10 NOTE — TELEPHONE ENCOUNTER
Called mom to let her know about refill, and she will help him schedule an appointment in the coming months.  Aliyah Jackson, RN  Triage Nurse

## 2018-01-10 NOTE — TELEPHONE ENCOUNTER
Patient mother his calling about his medications. They now have insurance   That requires prescription refills to be sent to mail order. They use [facility]  [facility]. Needs the Prozac 10 mg sent in for 90 days, he is doing great on  It. They feel it works very well. He is also recommended to follow up due to his   Age with an adult provider, but mom is hesitant to do so. Noted on last visit note  Providers here that were suggested, he should schedule an appointment soon?  Mom is just hoping to get refill sent as soon as possible. Please sign if ok.    Aliyah Jackson, ELSY  Triage Nurse

## 2018-01-16 NOTE — PROGRESS NOTES
SUBJECTIVE:                                                    Morgan Coleman is a 21 year old male who presents to clinic today with mother because of:    Chief Complaint   Patient presents with     A.D.H.D     Recheck Medication        HPI:  ADHD Follow-Up    Date of last ADHD office visit: 5/8/2017  Status since last visit: Stable but he is having trouble sleeping.  Taking controlled (daily) medications as prescribed: Yes                                                                           ADHD Medication     Amphetamines Disp Start End    amphetamine-dextroamphetamine (ADDERALL XR) 10 MG per 24 hr capsule 30 capsule 5/8/2017     Sig - Route: Take 1 capsule (10 mg) by mouth daily Med check before any refills. - Oral    Class: Local Print      Huge improvement.   Mood is better. Focus is a lot better.   Not getting upset with things like before.   Less stressed at work.   Has been able to take orders now at [employer] and can keep things straight.   Eating less. Working out too.   Got 's license last week. Attention is good while driving. Has not done much on own but plans to start driving to work.   Taking Prozac 20 mg also. Thinks that dose is good.    Side effect: Not sleeping with it.   Time he wakes is quite variable so taking at different times of day. Lasts 18 hours.     Name of SCHOOL: Graduated      ROS:  Negative for constitutional, eye, ear, nose, throat, skin, respiratory, cardiac, and gastrointestinal other than those outlined in the HPI.    PROBLEM LIST:  Patient Active Problem List    Diagnosis Date Noted     Mixed anxiety depressive disorder 04/10/2017     Priority: Medium     Obesity 10/09/2014     Priority: Medium     Health Care Home 01/24/2012     Priority: Medium     See note 10/9/14 for care plan - summary of history       Seasonal allergic rhinitis 11/14/2011     Priority: Medium     11/08 Intradermal skin tests + Allergy to dust mites, cats, grass, pollen; sublingual  immunotherapy [id number] intermittent use; Dr. [facility] in [place]       Tinnitus 11/14/2011     Priority: Medium     Audiogram and ENT evaluation felt to be within normal 9/08- Dr. Chavarria @ [facility].        Auditory processing disorder 11/14/2011     Priority: Medium     9/02 [facility]       Vitamin D deficiency 11/14/2011     Priority: Medium     Level 27; 8/13: 3/11 Vit D 14, B12 484,  Vit A 0.43, Retinyl Palmitate 0.04, Zinc 71, Ferritin 20, Ceruloplasmin 22, Copper 122, nl CBC, TSH 2.21, Free T3 4.1, Free T4 0.89       Family history of coronary artery disease, Normal Lipid Panel 2008 11/14/2011     Priority: Medium     Chol 151, TG 51, LDL 93, HDL 48       PDD (pervasive developmental disorder), active      Priority: Medium     Followed DAN (Defeat Autism Now) Protocols thru Dr. Walker in [place], Dr. Taya Stockton in [place], Mercury chelation therapy with [facility] 7/01;  Dr. Axel Bernal @ [facility]; [facility] Chelation therapy, B12 shots, Vitamin Supplements, [place] Thyroid supplement; Gluten/ Casein- free diet; [facility] 8/05; [facility], Music therapy, [facility]       Learning disability      Priority: Medium     Brain MRI normal 8/06; Auditory Processing Therapy, Attention Training Therapy, Vision Therapy @ [facility]- 26 weeks 12/06       Language disorder involving understanding and expression of language      Priority: Medium     2/05 Dr. Joaquin- Primarily Expressive Language; 8/05 [facility]. Has done speech therapy at [facility]       ADHD, predominantly inattentive type      Priority: Medium     EEG normal 8/06 & 10/07 for Brain training 6/08 (27 sessions)  5/17- Adderall        MEDICATIONS:  Current Outpatient Prescriptions   Medication Sig Dispense Refill     FLUoxetine (PROZAC) 20 MG capsule Take 1 capsule (20 mg) by mouth daily Recheck before any refills. 30 capsule 3     amphetamine-dextroamphetamine (ADDERALL  "XR) 10 MG per 24 hr capsule Take 1 capsule (10 mg) by mouth daily Med check before any refills. 30 capsule 0      ALLERGIES:  No Known Allergies    Problem list and histories reviewed & adjusted, as indicated.    OBJECTIVE:                                                      /70 (BP Location: Right arm, Patient Position: Chair, Cuff Size: Adult Regular)  Pulse 85  Temp 98.4  F (36.9  C) (Tympanic)  Resp 20  Ht 6' 2.25\" (1.886 m)  Wt 253 lb 3 oz (114.8 kg)  SpO2 97%  BMI 32.29 kg/m2   Normalized stature-for-age data not available for patients older than 20 years.  Wt Readings from Last 5 Encounters:   06/21/17 253 lb 3 oz (114.8 kg)   05/08/17 263 lb 2 oz (119.4 kg)   04/10/17 267 lb 4 oz (121.2 kg)   09/21/16 263 lb 9.6 oz (119.6 kg) (>99 %)*   10/30/15 252 lb 6.4 oz (114.5 kg) (>99 %)*     * Growth percentiles are based on [facility] 2-20 Years data.       GENERAL: Active, alert, in no acute distress. Happy and more communicative today.     DIAGNOSTICS: None    ASSESSMENT/PLAN:                                                    1. ADHD, predominantly inattentive type  Good response to Adderall but the XR lasting too long. Has irregular sleep cycles anyways.   Wants to try to go to short acting. May need to adjust dose- 1-2 times per day.   - amphetamine-dextroamphetamine (ADDERALL) 10 MG per tablet; Take 1 tablet (10 mg) by mouth 2 times daily  Dispense: 60 tablet; Refill: 0    2. Mixed anxiety depressive disorder  Good response- would like to continue at same dose.   - FLUoxetine (PROZAC) 20 MG capsule; Take 1 capsule (20 mg) by mouth daily Recheck before any refills.  Dispense: 30 capsule; Refill: [facility]   - FLUoxetine (PROZAC) 20 MG capsule; Take 1 capsule (20 mg) by mouth daily  Dispense: 90 capsule; Refill: 1- sent to [facility]    3. Obesity, unspecified obesity severity, unspecified obesity type  Wt down some from Adderall most likely. Discussed diet and exercise.       FOLLOW UP: " 2-3 mos depending on how he is doing. Want to see back before new Adderall given.     Chastity Ambrocio MD   Anesthesia Type: 1% lidocaine with epinephrine

## 2018-02-07 ENCOUNTER — OFFICE VISIT (OUTPATIENT)
Dept: FAMILY MEDICINE | Facility: CLINIC | Age: 23
End: 2018-02-07
Payer: COMMERCIAL

## 2018-02-07 VITALS
HEART RATE: 88 BPM | SYSTOLIC BLOOD PRESSURE: 120 MMHG | BODY MASS INDEX: 31.61 KG/M2 | OXYGEN SATURATION: 97 % | RESPIRATION RATE: 16 BRPM | WEIGHT: 246.3 LBS | HEIGHT: 74 IN | TEMPERATURE: 98.5 F | DIASTOLIC BLOOD PRESSURE: 60 MMHG

## 2018-02-07 DIAGNOSIS — H92.02 LEFT EAR PAIN: Primary | ICD-10-CM

## 2018-02-07 PROCEDURE — 99213 OFFICE O/P EST LOW 20 MIN: CPT | Performed by: PHYSICIAN ASSISTANT

## 2018-02-07 ASSESSMENT — ANXIETY QUESTIONNAIRES
7. FEELING AFRAID AS IF SOMETHING AWFUL MIGHT HAPPEN: SEVERAL DAYS
GAD7 TOTAL SCORE: 5
2. NOT BEING ABLE TO STOP OR CONTROL WORRYING: NOT AT ALL
3. WORRYING TOO MUCH ABOUT DIFFERENT THINGS: SEVERAL DAYS
1. FEELING NERVOUS, ANXIOUS, OR ON EDGE: SEVERAL DAYS
5. BEING SO RESTLESS THAT IT IS HARD TO SIT STILL: NOT AT ALL
6. BECOMING EASILY ANNOYED OR IRRITABLE: MORE THAN HALF THE DAYS
IF YOU CHECKED OFF ANY PROBLEMS ON THIS QUESTIONNAIRE, HOW DIFFICULT HAVE THESE PROBLEMS MADE IT FOR YOU TO DO YOUR WORK, TAKE CARE OF THINGS AT HOME, OR GET ALONG WITH OTHER PEOPLE: NOT DIFFICULT AT ALL

## 2018-02-07 ASSESSMENT — PATIENT HEALTH QUESTIONNAIRE - PHQ9: 5. POOR APPETITE OR OVEREATING: NOT AT ALL

## 2018-02-07 NOTE — PROGRESS NOTES
SUBJECTIVE:   Morgan Coleman is a 22 year old male who presents to clinic today for the following health issues:      Ear Problem      Duration: 2-3 days    Description (location/character/radiation): left side    Intensity:  moderate    Accompanying signs and symptoms: pressure/bulging, painful    History (similar episodes/previous evaluation): patient thinks he had the flu last week, ongoing dx of tinnitus    Precipitating or alleviating factors: None    Therapies tried and outcome: tried to flush with water/Q-tips, didn't change anything     Patient is here today for evaluation of left ear pain  Ongoing for a few weeks, was worse a few days ago, now improving in terms of swelling but still having intermittent pain  No drainage of ear, no runny nose, no cough, no fever  No jaw pain    Problem list and histories reviewed & adjusted, as indicated.  Additional history: as documented    Patient Active Problem List   Diagnosis     PDD (pervasive developmental disorder), active     Learning disability     Language disorder involving understanding and expression of language     ADHD, predominantly inattentive type     Seasonal allergic rhinitis     Tinnitus     Auditory processing disorder     Vitamin D deficiency     Family history of coronary artery disease, Normal Lipid Panel 2008     Health Care [place]     Obesity     Mixed anxiety depressive disorder     Past Surgical History:   Procedure Laterality Date     ADENOIDECTOMY  1999     EXCISION OF NAIL FOLD, TOE  12/11    Dr. Haines- right toe     PE TUBES  1997 & 1999    X2     TONSILLECTOMY  4/09       Social History   Substance Use Topics     Smoking status: Never Smoker     Smokeless tobacco: Never Used     Alcohol use No     Family History   Problem Relation Age of Onset     Cardiovascular Paternal Grandmother 70     heart      Depression Mother      fibromyalgia     Obesity Mother      Depression Father      Anxiety Disorder Father      Autism Spectrum Disorder  "Father      Attention Deficit Disorder Father      Bipolar Disorder Maternal Grandmother          Current Outpatient Prescriptions   Medication Sig Dispense Refill     FLUoxetine (PROZAC) 10 MG capsule Take 1 capsule (10 mg) by mouth daily Take along with 10 mg= 30 mg 90 capsule 0     amphetamine-dextroamphetamine (ADDERALL XR) 15 MG per 24 hr capsule Take 1 capsule (15 mg) by mouth daily 30 capsule 0     FLUoxetine (PROZAC) 20 MG capsule Take 1 capsule (20 mg) by mouth daily Recheck before any refills. 30 capsule 0     [DISCONTINUED] FLUoxetine (PROZAC) 20 MG capsule TAKE 1 CAPSULE BY MOUTH  DAILY 90 capsule 0     [DISCONTINUED] FLUoxetine (PROZAC) 20 MG capsule Take 1 capsule (20 mg) by mouth daily 30 capsule 2     No Known Allergies    Reviewed and updated as needed this visit by clinical staff  Tobacco  Allergies  Med Hx  Surg Hx  Fam Hx  Soc Hx      Reviewed and updated as needed this visit by Provider         ROS:  Constitutional, HEENT, cardiovascular, pulmonary, gi and gu systems are negative, except as otherwise noted.    OBJECTIVE:     /60 (BP Location: Right arm, Patient Position: Chair, Cuff Size: Adult Large)  Pulse 88  Temp 98.5  F (36.9  C) (Oral)  Resp 16  Ht 6' 2\" (1.88 m)  Wt 246 lb 4.8 oz (111.7 kg)  SpO2 97%  BMI 31.62 kg/m2  Body mass index is 31.62 kg/(m^2).  GENERAL: healthy, alert and no distress  EYES: Eyes grossly normal to inspection, PERRL and conjunctivae and sclerae normal  HENT: normal cephalic/atraumatic, right ear: normal: no effusions, no erythema, normal landmarks, left ear: TM mildly injected, nose and mouth without ulcers or lesions, oropharynx clear and oral mucous membranes moist  NECK: no adenopathy, no asymmetry, masses, or scars and thyroid normal to palpation  RESP: lungs clear to auscultation - no rales, rhonchi or wheezes  CV: regular rate and rhythm, normal S1 S2, no S3 or S4, no murmur, click or rub, no peripheral edema and peripheral pulses " strong  MS: no gross musculoskeletal defects noted, no edema    Diagnostic Test Results:  none     ASSESSMENT/PLAN:             1. Left ear pain  New problem, discussed may be due to some eustachian tube dysfunction.  Recommend Tylenol and Ibuprofen for pain, discussed no infection present.  F/U if symptoms worsen or do not improve.      Risks, benefits and alternatives were discussed with patient. Agreeable to the plan of care.      Saranya Costa PA-C  [facility]

## 2018-02-07 NOTE — MR AVS SNAPSHOT
After Visit Summary   2/7/2018    Morgan Coleman    MRN: [id number]           Patient Information     Date Of Birth          1995        Visit Information        Provider Department      2/7/2018 3:10 PM Saranya Costa PA-C [facility]        Today's Diagnoses     Left ear pain    -  1      Care Instructions    LEFT EAR PAIN: NO INFECTION, RECOMMEND TYLENOL AND IBUPROFEN FOR PAIN RELIEF. SHOULD IMPROVE WITH TIME.    DUE FOR AN APPOINTMENT FOR MEDICATION REFILL IN April.          Follow-ups after your visit        Who to contact     If you have questions or need follow up information about today's clinic visit or your schedule please contact [facility] directly at [phone].  Normal or non-critical lab and imaging results will be communicated to you by [email or web address], letter or phone within 4 business days after the clinic has received the results. If you do not hear from us within 7 days, please contact the clinic through [email or web address] or phone. If you have a critical or abnormal lab result, we will notify you by phone as soon as possible.  Submit refill requests through [employer] Hero or call your pharmacy and they will forward the refill request to us. Please allow 3 business days for your refill to be completed.          Additional Information About Your Visit        MyChart Information     [employer] gives you secure access to your electronic health record. If you see a primary care provider, you can also send messages to your care team and make appointments. If you have questions, please call your primary care clinic.  If you do not have a primary care provider, please call [phone] and they will assist you.        Care EveryWhere ID     This is your Care EveryWhere ID. This could be used by other organizations to access your [place] medical records  [id number]        Your Vitals Were     Pulse Temperature Respirations Height Pulse Oximetry BMI (Body Mass Index)  "   88 98.5  F (36.9  C) (Oral) 16 6' 2\" (1.88 m) 97% 31.62 kg/m2       Blood Pressure from Last 3 Encounters:   02/07/18 120/60   10/17/17 118/78   06/21/17 118/70    Weight from Last 3 Encounters:   02/07/18 246 lb 4.8 oz (111.7 kg)   10/17/17 258 lb 12.8 oz (117.4 kg)   06/21/17 253 lb 3 oz (114.8 kg)              We Performed the Following     DEPRESSION ACTION PLAN (DAP)        Primary Care Provider Office Phone # Fax #    Chastity Crystal Ambrocio [phone] [address] [facility] [address]        Equal Access to Services     [facility] ALLI : Hadii rachel rizo hadatilioo Soradha, waaxda luqadaha, qaybta kaalmada adeegyada, raoul castillo . So [facility] [phone].    ATENCIÓN: Si habla español, tiene a lipscomb disposición servicios gratuitos de asistencia lingüística. Llame al [phone].    We comply with applicable federal civil rights laws and [place] laws. We do not discriminate on the basis of race, color, national origin, age, disability, sex, sexual orientation, or gender identity.            Thank you!     Thank you for choosing [facility]  for your care. Our goal is always to provide you with excellent care. Hearing back from our patients is one way we can continue to improve our services. Please take a few minutes to complete the written survey that you may receive in the mail after your visit with us. Thank you!             Your Updated Medication List - Protect others around you: Learn how to safely use, store and throw away your medicines at www.disposemymeds.org.          This list is accurate as of 2/7/18  3:13 PM.  Always use your most recent med list.                   Brand Name Dispense Instructions for use Diagnosis    amphetamine-dextroamphetamine 15 MG per 24 hr capsule    ADDERALL XR    30 capsule    Take 1 capsule (15 mg) by mouth daily    ADHD, predominantly inattentive type       * FLUoxetine 20 MG capsule    PROzac    30 " capsule    Take 1 capsule (20 mg) by mouth daily Recheck before any refills.    Mixed anxiety depressive disorder       * FLUoxetine 10 MG capsule    PROzac    90 capsule    Take 1 capsule (10 mg) by mouth daily Take along with 10 mg= 30 mg    Mixed anxiety depressive disorder       * Notice:  This list has 2 medication(s) that are the same as other medications prescribed for you. Read the directions carefully, and ask your doctor or other care provider to review them with you.

## 2018-02-07 NOTE — PATIENT INSTRUCTIONS
LEFT EAR PAIN: NO INFECTION, RECOMMEND TYLENOL AND IBUPROFEN FOR PAIN RELIEF. SHOULD IMPROVE WITH TIME.    DUE FOR AN APPOINTMENT FOR MEDICATION REFILL IN April.

## 2018-02-07 NOTE — NURSING NOTE
"Chief Complaint   Patient presents with     Ear Problem       Initial /60 (BP Location: Right arm, Patient Position: Chair, Cuff Size: Adult Large)  Pulse 88  Temp 98.5  F (36.9  C) (Oral)  Resp 16  Ht 6' 2\" (1.88 m)  Wt 246 lb 4.8 oz (111.7 kg)  SpO2 97%  BMI 31.62 kg/m2 Estimated body mass index is 31.62 kg/(m^2) as calculated from the following:    Height as of this encounter: 6' 2\" (1.88 m).    Weight as of this encounter: 246 lb 4.8 oz (111.7 kg).  Medication Reconciliation: complete   Carlitos Del Cid CMA (AAMA)    "

## 2018-02-07 NOTE — LETTER
My Depression Action Plan  Name: Morgan Coleman   Date of Birth 1995  Date: 2/7/2018    My doctor: Chastity Condon   My clinic: [facility]  [id number] [place] [id number] [phone]          GREEN    ZONE   Good Control    What it looks like:     Things are going generally well. You have normal up s and down s. You may even feel depressed from time to time, but bad moods usually last less than a day.   What you need to do:  1. Continue to care for yourself (see self care plan)  2. Check your depression survival kit and update it as needed  3. Follow your physician s recommendations including any medication.  4. Do not stop taking medication unless you consult with your physician first.           YELLOW         ZONE Getting Worse    What it looks like:     Depression is starting to interfere with your life.     It may be hard to get out of bed; you may be starting to isolate yourself from others.    Symptoms of depression are starting to last most all day and this has happened for several days.     You may have suicidal thoughts but they are not constant.   What you need to do:     1. Call your care team, your response to treatment will improve if you keep your care team informed of your progress. Yellow periods are signs an adjustment may need to be made.     2. Continue your self-care, even if you have to fake it!    3. Talk to someone in your support network    4. Open up your depression survival kit           RED    ZONE Medical Alert - Get Help    What it looks like:     Depression is seriously interfering with your life.     You may experience these or other symptoms: You can t get out of bed most days, can t work or engage in other necessary activities, you have trouble taking care of basic hygiene, or basic responsibilities, thoughts of suicide or death that will not go away, self-injurious behavior.     What you need to do:  1. Call your care team  and request a same-day appointment. If they are not available (weekends or after hours) call your local crisis line, emergency room or 911.      Electronically signed by: Carlitos Del Cid, February 7, 2018    Depression Self Care Plan / Survival Kit    Self-Care for Depression  Here s the deal. Your body and mind are really not as separate as most people think.  What you do and think affects how you feel and how you feel influences what you do and think. This means if you do things that people who feel good do, it will help you feel better.  Sometimes this is all it takes.  There is also a place for medication and therapy depending on how severe your depression is, so be sure to consult with your medical provider and/ or Behavioral Health Consultant if your symptoms are worsening or not improving.     In order to better manage my stress, I will:    Exercise  Get some form of exercise, every day. This will help reduce pain and release endorphins, the  feel good  chemicals in your brain. This is almost as good as taking antidepressants!  This is not the same as joining a gym and then never going! (they count on that by the way ) It can be as simple as just going for a walk or doing some gardening, anything that will get you moving.      Hygiene   Maintain good hygiene (Get out of bed in the morning, Make your bed, Brush your teeth, Take a shower, and Get dressed like you were going to work, even if you are unemployed).  If your clothes don't fit try to get ones that do.    Diet  I will strive to eat foods that are good for me, drink plenty of water, and avoid excessive sugar, caffeine, alcohol, and other mood-altering substances.  Some foods that are helpful in depression are: complex carbohydrates, B vitamins, flaxseed, fish or fish oil, fresh fruits and vegetables.    Psychotherapy  I agree to participate in Individual Therapy (if recommended).    Medication  If prescribed medications, I agree to take them.   Missing doses can result in serious side effects.  I understand that drinking alcohol, or other illicit drug use, may cause potential side effects.  I will not stop my medication abruptly without first discussing it with my provider.    Staying Connected With Others  I will stay in touch with my friends, family members, and my primary care provider/team.    Use your imagination  Be creative.  We all have a creative side; it doesn t matter if it s oil painting, sand castles, or mud pies! This will also kick up the endorphins.    Witness Beauty  (AKA stop and smell the roses) Take a look outside, even in mid-winter. Notice colors, textures. Watch the squirrels and birds.     Service to others  Be of service to others.  There is always someone else in need.  By helping others we can  get out of ourselves  and remember the really important things.  This also provides opportunities for practicing all the other parts of the program.    Humor  Laugh and be silly!  Adjust your TV habits for less news and crime-drama and more comedy.    Control your stress  Try breathing deep, massage therapy, biofeedback, and meditation. Find time to relax each day.     My support system    Clinic Contact:  Phone number:    Contact 1:  Phone number:    Contact 2:  Phone number:    Uatsdin/:  Phone number:    Therapist:  Phone number:    Local crisis center:    Phone number:    Other community support:  Phone number:

## 2018-02-08 ASSESSMENT — ANXIETY QUESTIONNAIRES: GAD7 TOTAL SCORE: 5

## 2018-03-07 DIAGNOSIS — F90.0 ADHD, PREDOMINANTLY INATTENTIVE TYPE: ICD-10-CM

## 2018-03-07 NOTE — TELEPHONE ENCOUNTER
amphetamine-dextroamphetamine (ADDERALL XR) 15 MG per 24 hr capsule      Last Written Prescription Date:  1/8/2018  Last Fill Quantity: 30,   # refills: 0  Last Office Visit: 2/7/2018  Future Office visit:       Routing refill request to provider for review/approval because:  Drug not on the [facility], [facility] or [facility] refill protocol or controlled substance

## 2018-03-08 RX ORDER — DEXTROAMPHETAMINE SACCHARATE, AMPHETAMINE ASPARTATE MONOHYDRATE, DEXTROAMPHETAMINE SULFATE AND AMPHETAMINE SULFATE 3.75; 3.75; 3.75; 3.75 MG/1; MG/1; MG/1; MG/1
15 CAPSULE, EXTENDED RELEASE ORAL DAILY
Qty: 30 CAPSULE | Refills: 0 | Status: SHIPPED | OUTPATIENT
Start: 2018-03-08 | End: 2018-05-04

## 2018-03-12 ENCOUNTER — MYC MEDICAL ADVICE (OUTPATIENT)
Dept: FAMILY MEDICINE | Facility: CLINIC | Age: 23
End: 2018-03-12

## 2018-03-12 ENCOUNTER — TELEPHONE (OUTPATIENT)
Dept: FAMILY MEDICINE | Facility: CLINIC | Age: 23
End: 2018-03-12

## 2018-03-12 NOTE — TELEPHONE ENCOUNTER
Panel Management Review      Patient has the following on his problem list:     Depression / Dysthymia review    Measure:  Needs PHQ-9 score of 4 or less during index window.  Administer PHQ-9 and if score is 5 or more, send encounter to provider for next steps.    5   7 month window range: 09/2018    PHQ-9 SCORE 6/11/2013 4/10/2017 5/8/2017   Total Score 7 - -   Total Score - 12 9       If PHQ-9 recheck is 5 or more, route to provider for next steps.    Patient is due for:  PHQ9      Composite cancer screening  Chart review shows that this patient is due/due soon for the following None  Summary:    Patient is due/failing the following:   PHQ9    Action needed:   Patient needs to do PHQ9.    Type of outreach:    Sent [employer] message.    Questions for provider review:    None                                                                                                                                 Carlitos Del Cid CMA (AAMA)     Chart routed to Care Team.

## 2018-03-19 NOTE — TELEPHONE ENCOUNTER
2nd attempt, LM for pt to call back.  Carlitos Del Cid CMA ([facility])

## 2018-05-04 DIAGNOSIS — F90.0 ADHD, PREDOMINANTLY INATTENTIVE TYPE: ICD-10-CM

## 2018-05-04 NOTE — TELEPHONE ENCOUNTER
Mother will  at .     Requested Prescriptions   Pending Prescriptions Disp Refills     amphetamine-dextroamphetamine (ADDERALL XR) 15 MG per 24 hr capsule  Last Written Prescription Date: 03/08/2018  Last Fill Quantity: 30,  # refills: 0   Last office visit: 2/7/2018 with prescribing provider:  2/7/18  Future Office Visit:   30 capsule 0     Sig: Take 1 capsule (15 mg) by mouth daily    There is no refill protocol information for this order

## 2018-05-07 RX ORDER — DEXTROAMPHETAMINE SACCHARATE, AMPHETAMINE ASPARTATE MONOHYDRATE, DEXTROAMPHETAMINE SULFATE AND AMPHETAMINE SULFATE 3.75; 3.75; 3.75; 3.75 MG/1; MG/1; MG/1; MG/1
15 CAPSULE, EXTENDED RELEASE ORAL DAILY
Qty: 30 CAPSULE | Refills: 0 | Status: SHIPPED | OUTPATIENT
Start: 2018-05-07 | End: 2018-11-23

## 2018-05-07 NOTE — TELEPHONE ENCOUNTER
Will approve refill, will need to be seen by adult provider before next refill. Saranya Costa PA-C

## 2018-05-07 NOTE — TELEPHONE ENCOUNTER
Rx at front for mom to pick-up. LM for pt to call back.  Carlitos Del Cid CMA ([facility])

## 2018-05-08 ASSESSMENT — ANXIETY QUESTIONNAIRES
2. NOT BEING ABLE TO STOP OR CONTROL WORRYING: NOT AT ALL
7. FEELING AFRAID AS IF SOMETHING AWFUL MIGHT HAPPEN: NOT AT ALL
1. FEELING NERVOUS, ANXIOUS, OR ON EDGE: SEVERAL DAYS
3. WORRYING TOO MUCH ABOUT DIFFERENT THINGS: MORE THAN HALF THE DAYS
IF YOU CHECKED OFF ANY PROBLEMS ON THIS QUESTIONNAIRE, HOW DIFFICULT HAVE THESE PROBLEMS MADE IT FOR YOU TO DO YOUR WORK, TAKE CARE OF THINGS AT HOME, OR GET ALONG WITH OTHER PEOPLE: SOMEWHAT DIFFICULT
6. BECOMING EASILY ANNOYED OR IRRITABLE: MORE THAN HALF THE DAYS
5. BEING SO RESTLESS THAT IT IS HARD TO SIT STILL: SEVERAL DAYS
GAD7 TOTAL SCORE: 7

## 2018-05-08 ASSESSMENT — PATIENT HEALTH QUESTIONNAIRE - PHQ9: 5. POOR APPETITE OR OVEREATING: SEVERAL DAYS

## 2018-05-09 ASSESSMENT — PATIENT HEALTH QUESTIONNAIRE - PHQ9: SUM OF ALL RESPONSES TO PHQ QUESTIONS 1-9: 9

## 2018-05-09 ASSESSMENT — ANXIETY QUESTIONNAIRES: GAD7 TOTAL SCORE: 7

## 2018-05-21 ENCOUNTER — OFFICE VISIT (OUTPATIENT)
Dept: FAMILY MEDICINE | Facility: CLINIC | Age: 23
End: 2018-05-21
Payer: COMMERCIAL

## 2018-05-21 VITALS
SYSTOLIC BLOOD PRESSURE: 128 MMHG | DIASTOLIC BLOOD PRESSURE: 70 MMHG | WEIGHT: 256.6 LBS | BODY MASS INDEX: 31.9 KG/M2 | HEIGHT: 75 IN | RESPIRATION RATE: 16 BRPM | OXYGEN SATURATION: 96 % | HEART RATE: 92 BPM | TEMPERATURE: 98.3 F

## 2018-05-21 DIAGNOSIS — F90.0 ADHD, PREDOMINANTLY INATTENTIVE TYPE: ICD-10-CM

## 2018-05-21 DIAGNOSIS — F41.8 MIXED ANXIETY DEPRESSIVE DISORDER: ICD-10-CM

## 2018-05-21 PROCEDURE — 99213 OFFICE O/P EST LOW 20 MIN: CPT | Performed by: PHYSICIAN ASSISTANT

## 2018-05-21 RX ORDER — DEXTROAMPHETAMINE SACCHARATE, AMPHETAMINE ASPARTATE MONOHYDRATE, DEXTROAMPHETAMINE SULFATE AND AMPHETAMINE SULFATE 3.75; 3.75; 3.75; 3.75 MG/1; MG/1; MG/1; MG/1
15 CAPSULE, EXTENDED RELEASE ORAL DAILY
Qty: 30 CAPSULE | Refills: 0 | Status: CANCELLED | OUTPATIENT
Start: 2018-05-21

## 2018-05-21 RX ORDER — DEXTROAMPHETAMINE SACCHARATE, AMPHETAMINE ASPARTATE MONOHYDRATE, DEXTROAMPHETAMINE SULFATE AND AMPHETAMINE SULFATE 3.75; 3.75; 3.75; 3.75 MG/1; MG/1; MG/1; MG/1
15 CAPSULE, EXTENDED RELEASE ORAL DAILY
Qty: 30 CAPSULE | Refills: 0 | Status: SHIPPED | OUTPATIENT
Start: 2018-07-22 | End: 2018-08-21

## 2018-05-21 RX ORDER — FLUOXETINE 10 MG/1
10 CAPSULE ORAL DAILY
Qty: 90 CAPSULE | Refills: 1 | Status: SHIPPED | OUTPATIENT
Start: 2018-05-21 | End: 2018-11-23

## 2018-05-21 RX ORDER — DEXTROAMPHETAMINE SACCHARATE, AMPHETAMINE ASPARTATE MONOHYDRATE, DEXTROAMPHETAMINE SULFATE AND AMPHETAMINE SULFATE 3.75; 3.75; 3.75; 3.75 MG/1; MG/1; MG/1; MG/1
15 CAPSULE, EXTENDED RELEASE ORAL DAILY
Qty: 30 CAPSULE | Refills: 0 | Status: SHIPPED | OUTPATIENT
Start: 2018-06-21 | End: 2018-07-21

## 2018-05-21 RX ORDER — DEXTROAMPHETAMINE SACCHARATE, AMPHETAMINE ASPARTATE MONOHYDRATE, DEXTROAMPHETAMINE SULFATE AND AMPHETAMINE SULFATE 3.75; 3.75; 3.75; 3.75 MG/1; MG/1; MG/1; MG/1
15 CAPSULE, EXTENDED RELEASE ORAL DAILY
Qty: 30 CAPSULE | Refills: 0 | Status: SHIPPED | OUTPATIENT
Start: 2018-05-21 | End: 2018-06-20

## 2018-05-21 NOTE — MR AVS SNAPSHOT
After Visit Summary   5/21/2018    Morgan Coleman    MRN: [id number]           Patient Information     Date Of Birth          1995        Visit Information        Provider Department      5/21/2018 1:30 PM Saranya Costa PA-C [facility]        Today's Diagnoses     ADHD, predominantly inattentive type        Mixed anxiety depressive disorder           Follow-ups after your visit        Follow-up notes from your care team     Return in about 3 months (around 8/21/2018) for ADHD Check Up.      Who to contact     If you have questions or need follow up information about today's clinic visit or your schedule please contact [facility] directly at [phone].  Normal or non-critical lab and imaging results will be communicated to you by MyChart, letter or phone within 4 business days after the clinic has received the results. If you do not hear from us within 7 days, please contact the clinic through Jayporehart or phone. If you have a critical or abnormal lab result, we will notify you by phone as soon as possible.  Submit refill requests through FilterEasy or call your pharmacy and they will forward the refill request to us. Please allow 3 business days for your refill to be completed.          Additional Information About Your Visit        MyChart Information     FilterEasy gives you secure access to your electronic health record. If you see a primary care provider, you can also send messages to your care team and make appointments. If you have questions, please call your primary care clinic.  If you do not have a primary care provider, please call [phone] and they will assist you.        Care EveryWhere ID     This is your Care EveryWhere ID. This could be used by other organizations to access your [place] medical records  [id number]        Your Vitals Were     Pulse Temperature Respirations Height Pulse Oximetry BMI (Body Mass Index)    92 98.3  F (36.8  " C) (Oral) 16 6' 2.5\" (1.892 m) 96% 32.5 kg/m2       Blood Pressure from Last 3 Encounters:   05/21/18 128/70   02/07/18 120/60   10/17/17 118/78    Weight from Last 3 Encounters:   05/21/18 256 lb 9.6 oz (116.4 kg)   02/07/18 246 lb 4.8 oz (111.7 kg)   10/17/17 258 lb 12.8 oz (117.4 kg)              Today, you had the following     No orders found for display         Today's Medication Changes          These changes are accurate as of 5/21/18  1:42 PM.  If you have any questions, ask your nurse or doctor.               These medicines have changed or have updated prescriptions.        Dose/Directions    * amphetamine-dextroamphetamine 15 MG per 24 hr capsule   Commonly known as:  ADDERALL XR   This may have changed:  Another medication with the same name was added. Make sure you understand how and when to take each.   Used for:  ADHD, predominantly inattentive type   Changed by:  Saranya Costa PA-C        Dose:  15 mg   Take 1 capsule (15 mg) by mouth daily   Quantity:  30 capsule   Refills:  0       * amphetamine-dextroamphetamine 15 MG per 24 hr capsule   Commonly known as:  ADDERALL XR   This may have changed:  You were already taking a medication with the same name, and this prescription was added. Make sure you understand how and when to take each.   Used for:  ADHD, predominantly inattentive type   Changed by:  Saranya Costa PA-C        Dose:  15 mg   Take 1 capsule (15 mg) by mouth daily   Quantity:  30 capsule   Refills:  0       * amphetamine-dextroamphetamine 15 MG per 24 hr capsule   Commonly known as:  ADDERALL XR   This may have changed:  You were already taking a medication with the same name, and this prescription was added. Make sure you understand how and when to take each.   Used for:  ADHD, predominantly inattentive type   Changed by:  Saranya Costa PA-C        Dose:  15 mg   Start taking on:  6/21/2018   Take 1 capsule (15 mg) by mouth daily   Quantity:  30 " capsule   Refills:  0       * amphetamine-dextroamphetamine 15 MG per 24 hr capsule   Commonly known as:  ADDERALL XR   This may have changed:  You were already taking a medication with the same name, and this prescription was added. Make sure you understand how and when to take each.   Used for:  ADHD, predominantly inattentive type   Changed by:  Saranya Costa PA-C        Dose:  15 mg   Start taking on:  7/22/2018   Take 1 capsule (15 mg) by mouth daily   Quantity:  30 capsule   Refills:  0       * Notice:  This list has 4 medication(s) that are the same as other medications prescribed for you. Read the directions carefully, and ask your doctor or other care provider to review them with you.         Where to get your medicines      Some of these will need a paper prescription and others can be bought over the counter.  Ask your nurse if you have questions.     Bring a paper prescription for each of these medications     amphetamine-dextroamphetamine 15 MG per 24 hr capsule    amphetamine-dextroamphetamine 15 MG per 24 hr capsule    amphetamine-dextroamphetamine 15 MG per 24 hr capsule    FLUoxetine 10 MG capsule    FLUoxetine 20 MG capsule                Primary Care Provider Office Phone # Fax #    Saranya Costa PA-C [phone]       [address] [facility] [address]        Equal Access to Services     TONJA CARSON AH: Hadii rachel ku hadasho Soomaali, waaxda luqadaha, qaybta kaalmada adeegyada, waxay idiin hayelyn phan polanco. So [facility] [phone].    ATENCIÓN: Si habla español, tiene a lipscomb disposición servicios gratuitos de asistencia lingüística. Llame al [phone].    We comply with applicable federal civil rights laws and [place] laws. We do not discriminate on the basis of race, color, national origin, age, disability, sex, sexual orientation, or gender identity.            Thank you!     Thank you for choosing [facility]  for your care.  Our goal is always to provide you with excellent care. Hearing back from our patients is one way we can continue to improve our services. Please take a few minutes to complete the written survey that you may receive in the mail after your visit with us. Thank you!             Your Updated Medication List - Protect others around you: Learn how to safely use, store and throw away your medicines at www.disposemymeds.org.          This list is accurate as of 5/21/18  1:42 PM.  Always use your most recent med list.                   Brand Name Dispense Instructions for use Diagnosis    * amphetamine-dextroamphetamine 15 MG per 24 hr capsule    ADDERALL XR    30 capsule    Take 1 capsule (15 mg) by mouth daily    ADHD, predominantly inattentive type       * amphetamine-dextroamphetamine 15 MG per 24 hr capsule    ADDERALL XR    30 capsule    Take 1 capsule (15 mg) by mouth daily    ADHD, predominantly inattentive type       * amphetamine-dextroamphetamine 15 MG per 24 hr capsule   Start taking on:  6/21/2018    ADDERALL XR    30 capsule    Take 1 capsule (15 mg) by mouth daily    ADHD, predominantly inattentive type       * amphetamine-dextroamphetamine 15 MG per 24 hr capsule   Start taking on:  7/22/2018    ADDERALL XR    30 capsule    Take 1 capsule (15 mg) by mouth daily    ADHD, predominantly inattentive type       * FLUoxetine 10 MG capsule    PROzac    90 capsule    Take 1 capsule (10 mg) by mouth daily Take along with 10 mg= 30 mg    Mixed anxiety depressive disorder       * FLUoxetine 20 MG capsule    PROzac    90 capsule    Take 1 capsule (20 mg) by mouth daily Recheck before any refills.    Mixed anxiety depressive disorder       * Notice:  This list has 6 medication(s) that are the same as other medications prescribed for you. Read the directions carefully, and ask your doctor or other care provider to review them with you.

## 2018-05-21 NOTE — PROGRESS NOTES
"  SUBJECTIVE:   Morgan Coleman is a 22 year old male who presents to clinic today for the following health issues:      Medication Followup of Adderall XR    Taking Medication as prescribed: yes    Side Effects:  None    Medication Helping Symptoms:  Yes      Patient is here today for Adderall refill  States he has been taking medication for a little over a year  He notes that he is working at [employer] full time, trying to work more  He feels that his medication helps him to stay focused while there, sometimes feels \"hyperfocused  Patient reports that he feels very tired when he is not on it (when he runs out)  Otherwise no side effects from medication  He is sleeping okay, is sometimes waking at night, but able to fall back asleep  He notes he also needs his Prozac refilled, states he is only taking 20mg as he needs refills of 10mg  No SI/HI  He notes that this medication helps him to stay more even, otherwise when stressed his anxiety gets worse  No chest pain or shortness of breath         Problem list and histories reviewed & adjusted, as indicated.  Additional history: as documented    Patient Active Problem List   Diagnosis     PDD (pervasive developmental disorder), active     Learning disability     Language disorder involving understanding and expression of language     ADHD, predominantly inattentive type     Seasonal allergic rhinitis     Tinnitus     Auditory processing disorder     Vitamin D deficiency     Family history of coronary artery disease, Normal Lipid Panel 2008     Health Care Home     Obesity     Mixed anxiety depressive disorder     Past Surgical History:   Procedure Laterality Date     ADENOIDECTOMY  1999     EXCISION OF NAIL FOLD, TOE  12/11    Dr. Haines- right toe     PE TUBES  1997 & 1999    X2     TONSILLECTOMY  4/09       Social History   Substance Use Topics     Smoking status: Never Smoker     Smokeless tobacco: Never Used     Alcohol use No     Family History   Problem Relation " Age of Onset     Cardiovascular Paternal Grandmother 70     heart      Depression Mother      fibromyalgia     Obesity Mother      Depression Father      Anxiety Disorder Father      Autism Spectrum Disorder Father      Attention Deficit Disorder Father      Bipolar Disorder Maternal Grandmother          Current Outpatient Prescriptions   Medication Sig Dispense Refill     amphetamine-dextroamphetamine (ADDERALL XR) 15 MG per 24 hr capsule Take 1 capsule (15 mg) by mouth daily 30 capsule 0     amphetamine-dextroamphetamine (ADDERALL XR) 15 MG per 24 hr capsule Take 1 capsule (15 mg) by mouth daily 30 capsule 0     [START ON 6/21/2018] amphetamine-dextroamphetamine (ADDERALL XR) 15 MG per 24 hr capsule Take 1 capsule (15 mg) by mouth daily 30 capsule 0     [START ON 7/22/2018] amphetamine-dextroamphetamine (ADDERALL XR) 15 MG per 24 hr capsule Take 1 capsule (15 mg) by mouth daily 30 capsule 0     FLUoxetine (PROZAC) 10 MG capsule Take 1 capsule (10 mg) by mouth daily Take along with 10 mg= 30 mg 90 capsule 1     FLUoxetine (PROZAC) 20 MG capsule Take 1 capsule (20 mg) by mouth daily Recheck before any refills. 90 capsule 1     [DISCONTINUED] FLUoxetine (PROZAC) 10 MG capsule Take 1 capsule (10 mg) by mouth daily Take along with 10 mg= 30 mg 90 capsule 0     [DISCONTINUED] FLUoxetine (PROZAC) 20 MG capsule Take 1 capsule (20 mg) by mouth daily Recheck before any refills. 30 capsule 0     Allergies   Allergen Reactions     Mites [Dust Mites]      Pollen Extract        Reviewed and updated as needed this visit by clinical staff  Tobacco  Allergies  Meds  Med Hx  Surg Hx  Fam Hx  Soc Hx      Reviewed and updated as needed this visit by Provider         ROS:  Constitutional, HEENT, cardiovascular, pulmonary, gi and gu systems are negative, except as otherwise noted.    OBJECTIVE:     /70 (BP Location: Right arm, Patient Position: Chair, Cuff Size: Adult Regular)  Pulse 92  Temp 98.3  F (36.8  C) (Oral)   "Resp 16  Ht 6' 2.5\" (1.892 m)  Wt 256 lb 9.6 oz (116.4 kg)  SpO2 96%  BMI 32.5 kg/m2  Body mass index is 32.5 kg/(m^2).  GENERAL: healthy, alert and no distress  NECK: no adenopathy, no asymmetry, masses, or scars and thyroid normal to palpation  RESP: lungs clear to auscultation - no rales, rhonchi or wheezes  CV: regular rate and rhythm, normal S1 S2, no S3 or S4, no murmur, click or rub, no peripheral edema and peripheral pulses strong  MS: no gross musculoskeletal defects noted, no edema    Diagnostic Test Results:  none     ASSESSMENT/PLAN:             1. ADHD, predominantly inattentive type  Chronic issue, 3 month supply provided. Recheck in 3 months.  - amphetamine-dextroamphetamine (ADDERALL XR) 15 MG per 24 hr capsule; Take 1 capsule (15 mg) by mouth daily  Dispense: 30 capsule; Refill: 0  - amphetamine-dextroamphetamine (ADDERALL XR) 15 MG per 24 hr capsule; Take 1 capsule (15 mg) by mouth daily  Dispense: 30 capsule; Refill: 0  - amphetamine-dextroamphetamine (ADDERALL XR) 15 MG per 24 hr capsule; Take 1 capsule (15 mg) by mouth daily  Dispense: 30 capsule; Refill: 0    2. Mixed anxiety depressive disorder  Chronic issue, stable, will continue him on 30mg daily.  No SI/HI.  - FLUoxetine (PROZAC) 10 MG capsule; Take 1 capsule (10 mg) by mouth daily Take along with 10 mg= 30 mg  Dispense: 90 capsule; Refill: 1  - FLUoxetine (PROZAC) 20 MG capsule; Take 1 capsule (20 mg) by mouth daily Recheck before any refills.  Dispense: 90 capsule; Refill: 1    Risks, benefits and alternatives were discussed with patient. Agreeable to the plan of care.      Saranya Costa PA-C  [facility]"

## 2018-06-10 ENCOUNTER — HEALTH MAINTENANCE LETTER (OUTPATIENT)
Age: 23
End: 2018-06-10

## 2018-07-01 ENCOUNTER — HEALTH MAINTENANCE LETTER (OUTPATIENT)
Age: 23
End: 2018-07-01

## 2018-08-02 DIAGNOSIS — F41.8 MIXED ANXIETY DEPRESSIVE DISORDER: ICD-10-CM

## 2018-08-03 NOTE — TELEPHONE ENCOUNTER
"Requested Prescriptions   Pending Prescriptions Disp Refills     FLUoxetine (PROZAC) 10 MG capsule [Pharmacy Med Name: FLUOXETINE  10MG  CAP]  Last Written Prescription Date:  5/21/18  Last Fill Quantity: 90,  # refills: 1   Last office visit: 5/21/18 with prescribing provider:  Saranya Costa PA-C    Future Office Visit:     90 capsule      Sig: TAKE 1 CAPSULE BY MOUTH  DAILY TAKE ALONG WITH 10  MG= 30 MG    SSRIs Protocol Passed    8/2/2018  9:50 PM  PHQ-9 SCORE 4/10/2017 5/8/2017 5/8/2018   Total Score - - -   Total Score 12 9 9     ISAMAR-7 SCORE 5/8/2017 2/7/2018 5/8/2018   Total Score - - -   Total Score 4 5 7              Passed - Recent (12 mo) or future (30 days) visit within the authorizing provider's specialty    Patient had office visit in the last 12 months or has a visit in the next 30 days with authorizing provider or within the authorizing provider's specialty.  See \"Patient Info\" tab in inbasket, or \"Choose Columns\" in Meds & Orders section of the refill encounter.           Passed - Patient is age 18 or older          "

## 2018-08-06 RX ORDER — FLUOXETINE 10 MG/1
CAPSULE ORAL
Qty: 90 CAPSULE | Refills: 1 | Status: SHIPPED | OUTPATIENT
Start: 2018-08-06 | End: 2018-11-23

## 2018-08-06 NOTE — TELEPHONE ENCOUNTER
1st attempt, called and talked to pt.  Apt scheduled for 8/8/18.  Carlitos Del Cid CMA ([facility])

## 2018-11-23 ENCOUNTER — OFFICE VISIT (OUTPATIENT)
Dept: FAMILY MEDICINE | Facility: CLINIC | Age: 23
End: 2018-11-23
Payer: COMMERCIAL

## 2018-11-23 VITALS
RESPIRATION RATE: 16 BRPM | TEMPERATURE: 97.8 F | WEIGHT: 266.3 LBS | SYSTOLIC BLOOD PRESSURE: 116 MMHG | OXYGEN SATURATION: 97 % | BODY MASS INDEX: 33.11 KG/M2 | HEART RATE: 92 BPM | DIASTOLIC BLOOD PRESSURE: 78 MMHG | HEIGHT: 75 IN

## 2018-11-23 DIAGNOSIS — Z23 ENCOUNTER FOR IMMUNIZATION: ICD-10-CM

## 2018-11-23 DIAGNOSIS — F41.8 MIXED ANXIETY DEPRESSIVE DISORDER: ICD-10-CM

## 2018-11-23 DIAGNOSIS — F84.0 ACTIVE AUTISTIC DISORDER: ICD-10-CM

## 2018-11-23 DIAGNOSIS — F90.0 ADHD, PREDOMINANTLY INATTENTIVE TYPE: Primary | ICD-10-CM

## 2018-11-23 PROCEDURE — 99214 OFFICE O/P EST MOD 30 MIN: CPT | Mod: 25 | Performed by: PHYSICIAN ASSISTANT

## 2018-11-23 PROCEDURE — 90471 IMMUNIZATION ADMIN: CPT | Performed by: PHYSICIAN ASSISTANT

## 2018-11-23 PROCEDURE — 90686 IIV4 VACC NO PRSV 0.5 ML IM: CPT | Performed by: PHYSICIAN ASSISTANT

## 2018-11-23 RX ORDER — FLUOXETINE 10 MG/1
CAPSULE ORAL
Qty: 90 CAPSULE | Refills: 1 | Status: CANCELLED | OUTPATIENT
Start: 2018-11-23

## 2018-11-23 RX ORDER — DEXTROAMPHETAMINE SACCHARATE, AMPHETAMINE ASPARTATE MONOHYDRATE, DEXTROAMPHETAMINE SULFATE AND AMPHETAMINE SULFATE 3.75; 3.75; 3.75; 3.75 MG/1; MG/1; MG/1; MG/1
15 CAPSULE, EXTENDED RELEASE ORAL DAILY
Qty: 30 CAPSULE | Refills: 0 | Status: CANCELLED | OUTPATIENT
Start: 2018-11-23

## 2018-11-23 RX ORDER — FLUOXETINE 10 MG/1
CAPSULE ORAL
Qty: 90 CAPSULE | Refills: 1 | Status: SHIPPED | OUTPATIENT
Start: 2018-11-23 | End: 2018-12-12

## 2018-11-23 ASSESSMENT — ANXIETY QUESTIONNAIRES
6. BECOMING EASILY ANNOYED OR IRRITABLE: NEARLY EVERY DAY
2. NOT BEING ABLE TO STOP OR CONTROL WORRYING: NOT AT ALL
5. BEING SO RESTLESS THAT IT IS HARD TO SIT STILL: SEVERAL DAYS
7. FEELING AFRAID AS IF SOMETHING AWFUL MIGHT HAPPEN: SEVERAL DAYS
3. WORRYING TOO MUCH ABOUT DIFFERENT THINGS: MORE THAN HALF THE DAYS
IF YOU CHECKED OFF ANY PROBLEMS ON THIS QUESTIONNAIRE, HOW DIFFICULT HAVE THESE PROBLEMS MADE IT FOR YOU TO DO YOUR WORK, TAKE CARE OF THINGS AT HOME, OR GET ALONG WITH OTHER PEOPLE: VERY DIFFICULT
GAD7 TOTAL SCORE: 9
1. FEELING NERVOUS, ANXIOUS, OR ON EDGE: MORE THAN HALF THE DAYS

## 2018-11-23 ASSESSMENT — PATIENT HEALTH QUESTIONNAIRE - PHQ9
5. POOR APPETITE OR OVEREATING: NOT AT ALL
SUM OF ALL RESPONSES TO PHQ QUESTIONS 1-9: 15

## 2018-11-23 NOTE — MR AVS SNAPSHOT
After Visit Summary   11/23/2018    Morgan Coleman    MRN: [id number]           Patient Information     Date Of Birth          1995        Visit Information        Provider Department      11/23/2018 1:50 PM Saranya Costa PA-C [facility]        Today's Diagnoses     ADHD, predominantly inattentive type    -  1    Mixed anxiety depressive disorder        Encounter for immunization        Active autistic disorder           Follow-ups after your visit        Follow-up notes from your care team     Return in about 3 weeks (around 12/14/2018) for Tetanus shot, 1 week nurse BP check.      Your next 10 appointments already scheduled     Nov 23, 2018  1:50 PM CST   Office Visit with [facility] ([facility])    [address] [facility] [address] [phone]           Bring a current list of meds and any records pertaining to this visit. For Physicals, please bring immunization records and any forms needing to be filled out. Please arrive 10 minutes early to complete paperwork.            Dec 03, 2018  4:00 PM CST   Nurse Only with  NURSE   [facility] ([facility])    [address] [facility] [id number] [phone]            Dec 05, 2018  4:00 PM CST   Nurse Only with RM NURSE   [facility] ([facility])    [address] [facility] [id number] [phone]            Dec 21, 2018  3:50 PM CST   [facility]    [address] [facility] [id number] [phone]              Who to contact     If you have questions or need follow up information about today's clinic visit or your schedule please contact [facility] directly at [phone].  Normal or non-critical lab and imaging results will be  "communicated to you by Leikrhart, letter or phone within 4 business days after the clinic has received the results. If you do not hear from us within 7 days, please contact the clinic through copygram or phone. If you have a critical or abnormal lab result, we will notify you by phone as soon as possible.  Submit refill requests through copygram or call your pharmacy and they will forward the refill request to us. Please allow 3 business days for your refill to be completed.          Additional Information About Your Visit        copygram Information     copygram gives you secure access to your electronic health record. If you see a primary care provider, you can also send messages to your care team and make appointments. If you have questions, please call your primary care clinic.  If you do not have a primary care provider, please call [phone] and they will assist you.        Care EveryWhere ID     This is your Care EveryWhere ID. This could be used by other organizations to access your [place] medical records  [id number]        Your Vitals Were     Pulse Temperature Respirations Height Pulse Oximetry BMI (Body Mass Index)    92 97.8  F (36.6  C) (Oral) 16 6' 2.5\" (1.892 m) 97% 33.73 kg/m2       Blood Pressure from Last 3 Encounters:   11/23/18 116/78   05/21/18 128/70   02/07/18 120/60    Weight from Last 3 Encounters:   11/23/18 266 lb 4.8 oz (120.8 kg)   05/21/18 256 lb 9.6 oz (116.4 kg)   02/07/18 246 lb 4.8 oz (111.7 kg)              We Performed the Following     FLU VACCINE, SPLIT VIRUS, IM (QUADRIVALENT) [36434]- >3 YRS     Vaccine Administration, Initial [53994]          Where to get your medicines      These medications were sent to [facility] [address] [facility] [address] AT [place] [address]  [address] [facility] [address]     Phone:  [phone]     FLUoxetine 10 MG capsule    FLUoxetine 20 MG capsule          Primary Care Provider Office Phone # Fax " #    Saranya Costa PA-C [phone]       [address] [address] [address]        Equal Access to Services     TONJA CARSON : Hadii rachel rizo hadatilioo Sosloanali, waaxda luqadaha, qaybta kaalmada adexiomarayada, raoul negro lakokoasnjuana polanco. So [facility] [phone].    ATENCIÓN: Si habla español, tiene a lipscomb disposición servicios gratuitos de asistencia lingüística. Llame al [phone].    We comply with applicable federal civil rights laws and [place] laws. We do not discriminate on the basis of race, color, national origin, age, disability, sex, sexual orientation, or gender identity.            Thank you!     Thank you for choosing [facility]  for your care. Our goal is always to provide you with excellent care. Hearing back from our patients is one way we can continue to improve our services. Please take a few minutes to complete the written survey that you may receive in the mail after your visit with us. Thank you!             Your Updated Medication List - Protect others around you: Learn how to safely use, store and throw away your medicines at www.disposemymeds.org.          This list is accurate as of 11/23/18 11:57 AM.  Always use your most recent med list.                   Brand Name Dispense Instructions for use Diagnosis    * FLUoxetine 20 MG capsule    PROzac    90 capsule    Take 1 capsule (20 mg) by mouth daily Recheck before any refills.    Mixed anxiety depressive disorder       * FLUoxetine 10 MG capsule    PROzac    90 capsule    TAKE 1 CAPSULE BY MOUTH  DAILY TAKE ALONG WITH 10  MG= 30 MG    Mixed anxiety depressive disorder       * Notice:  This list has 2 medication(s) that are the same as other medications prescribed for you. Read the directions carefully, and ask your doctor or other care provider to review them with you.

## 2018-11-23 NOTE — PROGRESS NOTES
SUBJECTIVE:   Morgan Coleman is a 22 year old male who presents to clinic today for the following health issues:      1. Medication Followup of Adderall    Taking Medication as prescribed: NO-stopped taking 3-4 months ago due to side effects    Side Effects:  Says that the Adderall made his BP go up to 150s/80s    Medication Helping Symptoms:  Yes, when he was on it     Patient is here today accompanied by mom to discuss use of Adderall  He stopped his Adderall a few months ago, had high BP reading at grocery store  He denies any symptoms of high blood pressure  Since being off medication, is more tired during the day, no trouble though with focusing    2. Follow-up of Prozac 10 mg and 20 mg   Patient is also wanting to discuss his Prozac  Stopped taking 3-4 months ago when he stopped the Adderall and ran out of the 10 mg  Side effects of the medication while on it; none  He felt like this helped his mood quite a bit and would like to restart      3. Forms  Forms for [facility]          Problem list and histories reviewed & adjusted, as indicated.  Additional history: as documented    Patient Active Problem List   Diagnosis     Learning disability     Language disorder involving understanding and expression of language     ADHD, predominantly inattentive type     Seasonal allergic rhinitis     Tinnitus     Auditory processing disorder     Vitamin D deficiency     Family history of coronary artery disease, Normal Lipid Panel 2008     Health Care Home     Obesity     Mixed anxiety depressive disorder     Active autistic disorder     Past Surgical History:   Procedure Laterality Date     ADENOIDECTOMY  1999     EXCISION OF NAIL FOLD, TOE  12/11    Dr. Haines- right toe     PE TUBES  1997 & 1999    X2     TONSILLECTOMY  4/09       Social History   Substance Use Topics     Smoking status: Never Smoker     Smokeless tobacco: Never Used     Alcohol use No     Family History   Problem Relation Age of Onset     Cardiovascular  "Paternal Grandmother 70     heart      Depression Mother      fibromyalgia     Obesity Mother      Depression Father      Anxiety Disorder Father      Autism Spectrum Disorder Father      Attention Deficit Disorder Father      Bipolar Disorder Maternal Grandmother          Current Outpatient Prescriptions   Medication Sig Dispense Refill     FLUoxetine (PROZAC) 10 MG capsule TAKE 1 CAPSULE BY MOUTH  DAILY TAKE ALONG WITH 10  MG= 30 MG 90 capsule 1     FLUoxetine (PROZAC) 20 MG capsule Take 1 capsule (20 mg) by mouth daily Recheck before any refills. 90 capsule 1     [DISCONTINUED] FLUoxetine (PROZAC) 10 MG capsule TAKE 1 CAPSULE BY MOUTH  DAILY TAKE ALONG WITH 10  MG= 30 MG 90 capsule 1     [DISCONTINUED] FLUoxetine (PROZAC) 10 MG capsule Take 1 capsule (10 mg) by mouth daily Take along with 10 mg= 30 mg 90 capsule 1     [DISCONTINUED] FLUoxetine (PROZAC) 20 MG capsule Take 1 capsule (20 mg) by mouth daily Recheck before any refills. 90 capsule 1     Allergies   Allergen Reactions     Mites [Dust Mites]      Pollen Extract        Reviewed and updated as needed this visit by clinical staff  Tobacco  Meds  Med Hx  Surg Hx  Fam Hx  Soc Hx      Reviewed and updated as needed this visit by Provider         ROS:  Constitutional, HEENT, cardiovascular, pulmonary, gi and gu systems are negative, except as otherwise noted.    OBJECTIVE:     /78 (BP Location: Right arm, Patient Position: Chair, Cuff Size: Adult Regular)  Pulse 92  Temp 97.8  F (36.6  C) (Oral)  Resp 16  Ht 6' 2.5\" (1.892 m)  Wt 266 lb 4.8 oz (120.8 kg)  SpO2 97%  BMI 33.73 kg/m2  Body mass index is 33.73 kg/(m^2).  GENERAL: healthy, alert and no distress  NECK: no adenopathy, no asymmetry, masses, or scars and thyroid normal to palpation  RESP: lungs clear to auscultation - no rales, rhonchi or wheezes  CV: regular rate and rhythm, normal S1 S2, no S3 or S4, no murmur, click or rub, no peripheral edema and peripheral pulses strong  MS: no " gross musculoskeletal defects noted, no edema    Diagnostic Test Results:  none     ASSESSMENT/PLAN:             1. ADHD, predominantly inattentive type  Chronic issue, will restart Adderall to see if improves his focus and tiredness.  He has enough medication to start this at home, will return for BP recheck and if doing well on medicine without elevated BP, will refill meds.    2. Mixed anxiety depressive disorder  Chronic issue, PHQ9/GAD7, patient interested in restarting medication.  Orders placed. Will plan to recheck at office visit.  - FLUoxetine (PROZAC) 20 MG capsule; Take 1 capsule (20 mg) by mouth daily Recheck before any refills.  Dispense: 90 capsule; Refill: 1  - FLUoxetine (PROZAC) 10 MG capsule; TAKE 1 CAPSULE BY MOUTH  DAILY TAKE ALONG WITH 10  MG= 30 MG  Dispense: 90 capsule; Refill: 1    3. Encounter for immunization  - FLU VACCINE, SPLIT VIRUS, IM (QUADRIVALENT) [35563]- >3 YRS  - Vaccine Administration, Initial [66152]    4. Active autistic disorder  Filled out forms.      Risks, benefits and alternatives were discussed with patient. Agreeable to the plan of care.      Saranya Costa PA-C  [facility]    Injectable Influenza Immunization Documentation    1.  Is the person to be vaccinated sick today?   No    2. Does the person to be vaccinated have an allergy to a component   of the vaccine?   No  Egg Allergy Algorithm Link    3. Has the person to be vaccinated ever had a serious reaction   to influenza vaccine in the past?   No    4. Has the person to be vaccinated ever had Guillain-Barré syndrome?   No    Form completed by Carlitos Del Cid CMA (AAMA)

## 2018-11-24 ASSESSMENT — ANXIETY QUESTIONNAIRES: GAD7 TOTAL SCORE: 9

## 2018-11-30 ENCOUNTER — TRANSFERRED RECORDS (OUTPATIENT)
Dept: HEALTH INFORMATION MANAGEMENT | Facility: CLINIC | Age: 23
End: 2018-11-30

## 2018-12-12 ENCOUNTER — TELEPHONE (OUTPATIENT)
Dept: FAMILY MEDICINE | Facility: CLINIC | Age: 23
End: 2018-12-12

## 2018-12-12 DIAGNOSIS — F41.8 MIXED ANXIETY DEPRESSIVE DISORDER: ICD-10-CM

## 2018-12-12 RX ORDER — FLUOXETINE 10 MG/1
CAPSULE ORAL
Qty: 90 CAPSULE | Refills: 0 | Status: SHIPPED | OUTPATIENT
Start: 2018-12-12 | End: 2019-08-06

## 2018-12-12 NOTE — TELEPHONE ENCOUNTER
Will resend refill to [facility].    Pt was to return in 1 week for bp recheck - can do in the pharmacy or nurse only.  Pt was also to get a tetnaus shot.    Will forward to the station, please call and advise and see if they will schedule above things.  Thanks!

## 2018-12-12 NOTE — TELEPHONE ENCOUNTER
[facility] and for a 90 days supply because they are mail order

## 2018-12-21 ENCOUNTER — OFFICE VISIT (OUTPATIENT)
Dept: FAMILY MEDICINE | Facility: CLINIC | Age: 23
End: 2018-12-21
Payer: COMMERCIAL

## 2018-12-21 VITALS
BODY MASS INDEX: 33.69 KG/M2 | OXYGEN SATURATION: 95 % | TEMPERATURE: 97.9 F | RESPIRATION RATE: 16 BRPM | HEART RATE: 74 BPM | WEIGHT: 271 LBS | DIASTOLIC BLOOD PRESSURE: 80 MMHG | HEIGHT: 75 IN | SYSTOLIC BLOOD PRESSURE: 118 MMHG

## 2018-12-21 DIAGNOSIS — Z00.00 ROUTINE GENERAL MEDICAL EXAMINATION AT A HEALTH CARE FACILITY: Primary | ICD-10-CM

## 2018-12-21 DIAGNOSIS — E66.811 CLASS 1 OBESITY WITHOUT SERIOUS COMORBIDITY WITH BODY MASS INDEX (BMI) OF 34.0 TO 34.9 IN ADULT, UNSPECIFIED OBESITY TYPE: ICD-10-CM

## 2018-12-21 DIAGNOSIS — Z23 ENCOUNTER FOR IMMUNIZATION: ICD-10-CM

## 2018-12-21 PROCEDURE — 99395 PREV VISIT EST AGE 18-39: CPT | Mod: 25 | Performed by: PHYSICIAN ASSISTANT

## 2018-12-21 PROCEDURE — 90715 TDAP VACCINE 7 YRS/> IM: CPT | Performed by: PHYSICIAN ASSISTANT

## 2018-12-21 PROCEDURE — 90471 IMMUNIZATION ADMIN: CPT | Performed by: PHYSICIAN ASSISTANT

## 2018-12-21 ASSESSMENT — PATIENT HEALTH QUESTIONNAIRE - PHQ9
10. IF YOU CHECKED OFF ANY PROBLEMS, HOW DIFFICULT HAVE THESE PROBLEMS MADE IT FOR YOU TO DO YOUR WORK, TAKE CARE OF THINGS AT HOME, OR GET ALONG WITH OTHER PEOPLE: VERY DIFFICULT
5. POOR APPETITE OR OVEREATING: SEVERAL DAYS
SUM OF ALL RESPONSES TO PHQ QUESTIONS 1-9: 18
SUM OF ALL RESPONSES TO PHQ QUESTIONS 1-9: 18

## 2018-12-21 ASSESSMENT — ANXIETY QUESTIONNAIRES
6. BECOMING EASILY ANNOYED OR IRRITABLE: NEARLY EVERY DAY
GAD7 TOTAL SCORE: 11
7. FEELING AFRAID AS IF SOMETHING AWFUL MIGHT HAPPEN: SEVERAL DAYS
5. BEING SO RESTLESS THAT IT IS HARD TO SIT STILL: MORE THAN HALF THE DAYS
IF YOU CHECKED OFF ANY PROBLEMS ON THIS QUESTIONNAIRE, HOW DIFFICULT HAVE THESE PROBLEMS MADE IT FOR YOU TO DO YOUR WORK, TAKE CARE OF THINGS AT HOME, OR GET ALONG WITH OTHER PEOPLE: SOMEWHAT DIFFICULT
1. FEELING NERVOUS, ANXIOUS, OR ON EDGE: MORE THAN HALF THE DAYS
2. NOT BEING ABLE TO STOP OR CONTROL WORRYING: SEVERAL DAYS
3. WORRYING TOO MUCH ABOUT DIFFERENT THINGS: SEVERAL DAYS

## 2018-12-21 ASSESSMENT — ENCOUNTER SYMPTOMS
FREQUENCY: 0
DIZZINESS: 0
JOINT SWELLING: 0
PARESTHESIAS: 0
DYSURIA: 0
WEAKNESS: 0
ARTHRALGIAS: 0
PALPITATIONS: 0
SHORTNESS OF BREATH: 0
HEMATURIA: 0
EYE PAIN: 0
HEADACHES: 0
CONSTIPATION: 0
FEVER: 0
SORE THROAT: 0
HEARTBURN: 0
DIARRHEA: 0
CHILLS: 0
ABDOMINAL PAIN: 0
HEMATOCHEZIA: 0
MYALGIAS: 1
COUGH: 0
NAUSEA: 0
NERVOUS/ANXIOUS: 0

## 2018-12-21 ASSESSMENT — MIFFLIN-ST. JEOR: SCORE: 2306.94

## 2018-12-21 NOTE — PROGRESS NOTES
SUBJECTIVE:   CC: Morgan Coleman is an 23 year old male who presents for preventative health visit.     Physical   Annual:     Getting at least 3 servings of Calcium per day:  NO    Bi-annual eye exam:  Yes    Dental care twice a year:  Yes    Sleep apnea or symptoms of sleep apnea:  Daytime drowsiness and Sleep apnea    Diet:  Regular (no restrictions)    Frequency of exercise:  None    Taking medications regularly:  No    Barriers to taking medications:  Problems remembering to take them    Medication side effects:  Not applicable    Additional concerns today:  No    PHQ-2 Total Score: 3      Today's PHQ-2 Score:   PHQ-2 ( 1999 Pfizer) 12/21/2018   Q1: Little interest or pleasure in doing things 2   Q2: Feeling down, depressed or hopeless 1   PHQ-2 Score 3   Q1: Little interest or pleasure in doing things More than half the days   Q2: Feeling down, depressed or hopeless Several days   PHQ-2 Score 3     Abuse: Current or Past(Physical, Sexual or Emotional)- No  Do you feel safe in your environment? Yes    Social History     Tobacco Use     Smoking status: Never Smoker     Smokeless tobacco: Never Used   Substance Use Topics     Alcohol use: Yes     Alcohol/week: 0.0 oz     Comment: rarely     Alcohol Use 12/21/2018   If you drink alcohol do you typically have greater than 3 drinks per day OR greater than 7 drinks per week? No   No flowsheet data found.    Last PSA: No results found for: PSA    Reviewed orders with patient. Reviewed health maintenance and updated orders accordingly - Yes  Patient Active Problem List   Diagnosis     Learning disability     Language disorder involving understanding and expression of language     ADHD, predominantly inattentive type     Seasonal allergic rhinitis     Tinnitus     Auditory processing disorder     Vitamin D deficiency     Family history of coronary artery disease, Normal Lipid Panel 2008     Health Care Home     Obesity     Mixed anxiety depressive disorder     Active  autistic disorder     Past Surgical History:   Procedure Laterality Date     ADENOIDECTOMY  1999     EXCISION OF NAIL FOLD, TOE  12/11    Dr. Haines- right toe     PE TUBES  1997 & 1999    X2     TONSILLECTOMY  4/09       Social History     Tobacco Use     Smoking status: Never Smoker     Smokeless tobacco: Never Used   Substance Use Topics     Alcohol use: Yes     Alcohol/week: 0.0 oz     Comment: rarely     Family History   Problem Relation Age of Onset     Cardiovascular Paternal Grandmother 70        heart      Depression Mother         fibromyalgia     Obesity Mother      Depression Father      Anxiety Disorder Father      Autism Spectrum Disorder Father      Attention Deficit Disorder Father      Bipolar Disorder Maternal Grandmother          Current Outpatient Medications   Medication Sig Dispense Refill     FLUoxetine (PROZAC) 10 MG capsule TAKE 1 CAPSULE BY MOUTH  DAILY TAKE ALONG WITH 10  MG= 30 MG 90 capsule 0     FLUoxetine (PROZAC) 20 MG capsule Take 1 capsule (20 mg) by mouth daily Recheck before any refills. 90 capsule 0     Allergies   Allergen Reactions     Mites [Dust Mites]      Pollen Extract        Reviewed and updated as needed this visit by clinical staff  Tobacco  Allergies  Meds  Med Hx  Surg Hx  Fam Hx  Soc Hx        Reviewed and updated as needed this visit by Provider            Review of Systems   Constitutional: Negative for chills and fever.   HENT: Negative for congestion, ear pain, hearing loss and sore throat.    Eyes: Negative for pain and visual disturbance.   Respiratory: Negative for cough and shortness of breath.    Cardiovascular: Negative for chest pain, palpitations and peripheral edema.   Gastrointestinal: Negative for abdominal pain, constipation, diarrhea, heartburn, hematochezia and nausea.   Genitourinary: Negative for discharge, dysuria, frequency, genital sores, hematuria, impotence and urgency.   Musculoskeletal: Positive for myalgias. Negative for arthralgias  "and joint swelling.   Skin: Negative for rash.   Neurological: Negative for dizziness, weakness, headaches and paresthesias.   Psychiatric/Behavioral: Negative for mood changes. The patient is not nervous/anxious.        OBJECTIVE:   /80 (BP Location: Right arm, Patient Position: Chair, Cuff Size: Adult Large)   Pulse 74   Temp 97.9  F (36.6  C) (Oral)   Resp 16   Ht 1.892 m (6' 2.5\")   Wt 122.9 kg (271 lb)   SpO2 95%   BMI 34.33 kg/m      Physical Exam  GENERAL: healthy, alert and no distress  EYES: Eyes grossly normal to inspection, PERRL and conjunctivae and sclerae normal  HENT: ear canals and TM's normal, nose and mouth without ulcers or lesions  NECK: no adenopathy, no asymmetry, masses, or scars and thyroid normal to palpation  RESP: lungs clear to auscultation - no rales, rhonchi or wheezes  CV: regular rate and rhythm, normal S1 S2, no S3 or S4, no murmur, click or rub, no peripheral edema and peripheral pulses strong  ABDOMEN: soft, nontender, no hepatosplenomegaly, no masses and bowel sounds normal  MS: no gross musculoskeletal defects noted, no edema  SKIN: no suspicious lesions or rashes  NEURO: Normal strength and tone, mentation intact and speech normal  PSYCH: mentation appears normal, affect normal/bright    Diagnostic Test Results:  none     ASSESSMENT/PLAN:   1. Routine general medical examination at a health care facility      2. Encounter for immunization  - TDAP VACCINE (ADACEL)  - VACCINE ADMINISTRATION, INITIAL  - SCREENING QUESTIONS FOR ADULT IMMUNIZATIONS    3. Class 1 obesity without serious comorbidity with body mass index (BMI) of 34.0 to 34.9 in adult, unspecified obesity type  Discussed should work on diet and exercise.      COUNSELING:   Reviewed preventive health counseling, as reflected in patient instructions    BP Readings from Last 1 Encounters:   12/21/18 118/80     Estimated body mass index is 34.33 kg/m  as calculated from the following:    Height as of this " "encounter: 1.892 m (6' 2.5\").    Weight as of this encounter: 122.9 kg (271 lb).      Weight management plan: Discussed healthy diet and exercise guidelines     reports that  has never smoked. he has never used smokeless tobacco.      Counseling Resources:  ATP IV Guidelines  Pooled Cohorts Equation Calculator  FRAX Risk Assessment  ICSI Preventive Guidelines  Dietary Guidelines for Americans, 2010  [email or web address].cl's MyPlate  ASA Prophylaxis  Lung CA Screening    Saranya Costa PA-C  [facility] [place]  Answers for HPI/ROS submitted by the patient on 12/21/2018   Annual Exam:  If you checked off any problems, how difficult have these problems made it for you to do your work, take care of things at home, or get along with other people?: Very difficult  PHQ9 TOTAL SCORE: 18    "

## 2018-12-22 ASSESSMENT — PATIENT HEALTH QUESTIONNAIRE - PHQ9: SUM OF ALL RESPONSES TO PHQ QUESTIONS 1-9: 18

## 2018-12-22 ASSESSMENT — ANXIETY QUESTIONNAIRES: GAD7 TOTAL SCORE: 11

## 2019-04-10 ENCOUNTER — OFFICE VISIT (OUTPATIENT)
Dept: PODIATRY | Facility: CLINIC | Age: 24
End: 2019-04-10
Payer: COMMERCIAL

## 2019-04-10 VITALS
HEIGHT: 75 IN | BODY MASS INDEX: 33.69 KG/M2 | WEIGHT: 271 LBS | DIASTOLIC BLOOD PRESSURE: 76 MMHG | SYSTOLIC BLOOD PRESSURE: 120 MMHG

## 2019-04-10 DIAGNOSIS — L60.0 ONYCHOCRYPTOSIS: Primary | ICD-10-CM

## 2019-04-10 PROCEDURE — 99203 OFFICE O/P NEW LOW 30 MIN: CPT | Performed by: PODIATRIST

## 2019-04-10 ASSESSMENT — MIFFLIN-ST. JEOR: SCORE: 2306.94

## 2019-04-10 NOTE — LETTER
4/10/2019         RE: Morgan Coleman  [address] [facility] [id number]        Dear Colleague,    Thank you for referring your patient, Morgan Coleman, to the [facility]. Please see a copy of my visit note below.    PATIENT HISTORY:   Morgan Coleman is a 23 year old male who presents to clinic for ingrown nail to right great toe.  Had it removed from a different doctor years ago. Here with mom. States a while ago it was draining pus.  Hasn't in a little while. Denies fever, chills. No pain. They are wondering if it is infected. He did pull some of it.     Review of Systems:  Patient denies fever, chills, rash, wound, stiffness, limping, numbness, weakness, heart burn, blood in stool, chest pain with activity, calf pain when walking, shortness of breath with activity, chronic cough, easy bleeding/bruising, swelling of ankles, excessive thirst, fatigue, depression, anxiety.       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 Haines- right toe     PE TUBES  1997 & 1999    X2     TONSILLECTOMY  4/09        MEDICATIONS:   Current Outpatient Medications:      FLUoxetine (PROZAC) 10 MG capsule, TAKE 1 CAPSULE BY MOUTH  DAILY TAKE ALONG WITH 10  MG= 30 MG, Disp: 90  capsule, Rfl: 0     FLUoxetine (PROZAC) 20 MG capsule, Take 1 capsule (20 mg) by mouth daily Recheck before any refills., Disp: 90 capsule, Rfl: 0     ALLERGIES:    Allergies   Allergen Reactions     Mites [Dust Mites]      Pollen Extract         SOCIAL HISTORY:   Social History     Socioeconomic History     Marital status: Single     Spouse name: Not on file     Number of children: Not on file     Years of education: Not on file     Highest education level: Not on file   Occupational History     Not on file   Social Needs     Financial resource strain: Not on file     Food insecurity:     Worry: Not on file     Inability: Not on file     Transportation needs:     Medical: Not on file     Non-medical: Not on file   Tobacco Use     Smoking status: Never Smoker     Smokeless tobacco: Never Used   Substance and Sexual Activity     Alcohol use: Yes     Alcohol/week: 0.0 oz     Comment: rarely     Drug use: No     Sexual activity: Never   Lifestyle     Physical activity:     Days per week: Not on file     Minutes per session: Not on file     Stress: Not on file   Relationships     Social connections:     Talks on phone: Not on file     Gets together: Not on file     Attends [employer] service: Not on file     Active member of club or organization: Not on file     Attends meetings of clubs or organizations: Not on file     Relationship status: Not on file     Intimate partner violence:     Fear of current or ex partner: Not on file     Emotionally abused: Not on file     Physically abused: Not on file     Forced sexual activity: Not on file   Other Topics Concern     Parent/sibling w/ CABG, MI or angioplasty before 65F 55M? No   Social History Narrative     Not on file        FAMILY HISTORY:   Family History   Problem Relation Age of Onset     Cardiovascular Paternal Grandmother 70        heart      Depression Mother         fibromyalgia     Obesity Mother      Depression Father      Anxiety Disorder Father      Autism  "Spectrum Disorder Father      Attention Deficit Disorder Father      Bipolar Disorder Maternal Grandmother         EXAM:Vitals: /76   Ht 1.892 m (6' 2.5\")   Wt 122.9 kg (271 lb)   BMI 34.33 kg/m       General appearance: Patient is alert and fully cooperative with history & exam.  No sign of distress is noted during the visit.     Psychiatric: Affect is pleasant & appropriate.  Patient appears motivated to improve health.     Respiratory: Breathing is regular & unlabored while sitting.     HEENT: Hearing is intact to spoken word.  Speech is clear.  No gross evidence of visual impairment that would impact ambulation.     Dermatologic: medial border of the right great toenail.      Vascular: DP & PT pulses are intact & regular bilaterally.  No significant edema or varicosities noted.  CFT and skin temperature is normal to both lower extremities.     Neurologic: Lower extremity sensation is intact to light touch.  No evidence of weakness or contracture in the lower extremities.  No evidence of neuropathy.     Musculoskeletal: Patient is ambulatory without assistive device or brace.  No gross ankle deformity noted.  No foot or ankle joint effusion is noted.     ASSESSMENT: Onychocryptosis     PLAN:  Reviewed patient's chart in [facility]. The potential causes and nature of an ingrown toenail were discussed with the patient.  We reviewed the natural history/prognosis of the condition and potential risks if no treatment is provided.      Treatment options discussed included conservative management (oral antibiotics, soaking of foot, adequate width shoes)  as well as surgical management (partial or total nail removal).  The pros and cons of both forms of treatment were reviewed.      After thorough discussion and answering all questions, the patient elected to come back to have the permanent procedure done. Will apply bacitracin and bandage until then.        Valeria Azevedo DPM, Podiatry/Foot and Ankle " Surgery    Weight management plan: Patient was referred to their PCP to discuss a diet and exercise plan.    Recommended to Morgan Coleman to follow up with Primary Care provider regarding elevated blood pressure.        Again, thank you for allowing me to participate in the care of your patient.        Sincerely,        aVleria Azevedo DPM, Podiatry/Foot and Ankle Surgery

## 2019-04-10 NOTE — PATIENT INSTRUCTIONS
Thank you for choosing [facility]!    DR. JACKSON'S CLINIC SCHEDULE  MONDAY AM - [place] TUESDAY - [place]   [address] [address] [address] [address] [place] [address]   [phone] / [phone] / [phone]       WEDNESDAY - [place] FRIDAY AM - WOUND CENTER   [address]   [address] [address] [address] [address]   [phone] / [phone]       FRIDAY PM - [place] SCHEDULE SURGERY: [phone]   [address] BILLING QUESTIONS: [phone]   [address] [address] AFTER HOURS: [phone] / [phone] APPOINTMENTS: [phone]     Consumer Price Line (CPL) [phone]       INGROWN TOENAILS  When a toenail is ingrown, it is curved and grows into the skin, usually at the nail borders (the sides of the nail). This  digging in  of the nail irritates the skin, often creating pain, redness, swelling, and warmth in the toe.  If an ingrown nail causes a break in the skin, bacteria may enter and cause an infection in the area, which is often marked by drainage and a foul odor. However, even if the toe isn t painful, red, swollen, or warm, a nail that curves downward into the skin can progress to an infection.  CAUSES:  Heredity: In many people, the tendency for ingrown toenails is inherited.   Trauma: Sometimes an ingrown toenail is the result of trauma, such as stubbing your toe, having an object fall on your toe, or engaging in activities that involve repeated pressure on the toes, such as kicking or running.   Improper Trimming:  The most common cause of ingrown toenails is cutting your nails too short. This encourages the skin next to the nail to fold over the nail.   Improperly Sized Footwear: Ingrown toenails can result from wearing socks and shoes that are tight or short.   Nail Conditions: Ingrown toenails can be caused by nail problems, such as fungal  infections or losing a nail due to trauma.   TREATMENT: Sometimes initial treatment for ingrown toenails can be safely performed at home. However, home treatment is strongly discouraged if an infection is suspected, or for those who have medical conditions that put feet at high risk, such as diabetes, nerve damage in the foot, or poor circulation.  Home care: If you don t have an infection or any of the above medical conditions, you can soak your foot in room-temperature water (adding Epsom s salt may be recommended by your doctor), and gently massage the side of the nail fold to help reduce the inflammation.  Avoid attempting  bathroom surgery.  Repeated cutting of the nail can cause the condition to worsen over time. If your symptoms fail to improve, it s time to see a foot and ankle surgeon.  Physician care: After examining the toe, the foot and ankle surgeon will select the treatment best suited for you. If an infection is present, an oral antibiotic may be prescribed.  Sometimes a minor surgical procedure, often performed in the office, will ease the pain and remove the offending nail. After applying a local anesthetic, the doctor removes part of the nail s side border. Some nails may become ingrown again, requiring removal of the nail root.  Following the nail procedure, a light bandage will be applied. Most people experience very little pain after surgery and may resume normal activity the next day. If your surgeon has prescribed an oral antibiotic, be sure to take all the medication, even if your symptoms have improved.  PREVENTION:  Proper Trimming: Cut toenails in a fairly straight line, and don t cut them too short. You should be able to get your fingernail under the sides and end of the nail.   Well-fitting Footwear: Don t wear shoes that are short or tight in the toe area. Avoid shoes that are loose, because they too cause pressure on the toes, especially when running or walking briskly.     INGROWN  TOENAIL REMOVAL AFTERCARE     Go directly home and elevate the affected foot on one or two pillows for the remainder of the day/evening if possible. Your toe may stay numb anywhere from 2-8 hours.     Take Tylenol, ibuprofen or another anti-inflammatory as needed for pain.     Take antibiotic if that has been prescribed. Finish the entire prescribed antibiotic even if your symptoms have improved.     The evening of the procedure, soak/wash the affected area in warm water (you may add Epsom salt) for 5 to 10 minutes. Do this twice a day for 2-4 weeks (6-8 weeks if you had phenol) (you may count showering/bathing as one soak).  After soaks, pat the area dry and then allow to airdry for a few minutes. Apply antibiotic ointment to the area and cover with 2 X 2 gauze and paper tape or band-aid.    You may pursue everyday activities as tolerated with either an open toe shoe or cut-out shoe as needed or you may wear regular shoes if no pain is noted.    Watch for any signs and symptoms of infection such as: redness, red streaks going up the foot/leg, swelling, pus or foul odor. Those that have had the phenol procedure, the toe will drain longer and will look like it is infected because it is a chemical burn.     Please call with questions.      BODY WEIGHT AND YOUR FEET  The following information is included in the after visit summary for all patients. Body weight can be a sensitive issue to discuss in clinic, but we think the following information is very important. Although we focus on the feet and ankles, we do support the overall health of our patients.     Many things can cause foot and ankle problems. Foot structure, activity level, foot mechanics and injuries are common causes of pain. One very important issue that often goes unmentioned, is body weight. Extra weight can cause increased stress on muscles, ligaments, bones and tendons. Sometimes just a few extra pounds is all it takes to put one over her/his  threshold. Without reducing that stress, it can be difficult to alleviate pain. As Foot & Ankle specialists, our job is addressing the lower extremity problem and possible causes. Regarding extra body weight, we encourage patients to discuss diet and weight management plans with their primary care doctors. It is this team approach that gives you the best opportunity for pain relief and getting you back on your feet.      [place] has a Comprehensive Weight Management Program. This program includes counseling, education, non-surgical and surgical approaches to weight loss. If you are interested in learning more either talk to you primary care provider or call [phone].

## 2019-04-10 NOTE — PROGRESS NOTES
PATIENT HISTORY:   Morgan Coleman is a 23 year old male who presents to clinic for ingrown nail to right great toe.  Had it removed from a different doctor years ago. Here with mom. States a while ago it was draining pus.  Hasn't in a little while. Denies fever, chills. No pain. They are wondering if it is infected. He did pull some of it.     Review of Systems:  Patient denies fever, chills, rash, wound, stiffness, limping, numbness, weakness, heart burn, blood in stool, chest pain with activity, calf pain when walking, shortness of breath with activity, chronic cough, easy bleeding/bruising, swelling of ankles, excessive thirst, fatigue, depression, anxiety.       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 Haines- right toe     PE TUBES  1997 & 1999    X2     TONSILLECTOMY  4/09        MEDICATIONS:   Current Outpatient Medications:      FLUoxetine (PROZAC) 10 MG capsule, TAKE 1 CAPSULE BY MOUTH  DAILY TAKE ALONG WITH 10  MG= 30 MG, Disp: 90 capsule, Rfl: 0     FLUoxetine (PROZAC) 20 MG capsule, Take 1 capsule (20 mg) by mouth daily Recheck before any refills., Disp: 90 capsule, Rfl: 0     ALLERGIES:    Allergies   Allergen Reactions     Mites [Dust Mites]      Pollen Extract        "  SOCIAL HISTORY:   Social History     Socioeconomic History     Marital status: Single     Spouse name: Not on file     Number of children: Not on file     Years of education: Not on file     Highest education level: Not on file   Occupational History     Not on file   Social Needs     Financial resource strain: Not on file     Food insecurity:     Worry: Not on file     Inability: Not on file     Transportation needs:     Medical: Not on file     Non-medical: Not on file   Tobacco Use     Smoking status: Never Smoker     Smokeless tobacco: Never Used   Substance and Sexual Activity     Alcohol use: Yes     Alcohol/week: 0.0 oz     Comment: rarely     Drug use: No     Sexual activity: Never   Lifestyle     Physical activity:     Days per week: Not on file     Minutes per session: Not on file     Stress: Not on file   Relationships     Social connections:     Talks on phone: Not on file     Gets together: Not on file     Attends Voodoo service: Not on file     Active member of club or organization: Not on file     Attends meetings of clubs or organizations: Not on file     Relationship status: Not on file     Intimate partner violence:     Fear of current or ex partner: Not on file     Emotionally abused: Not on file     Physically abused: Not on file     Forced sexual activity: Not on file   Other Topics Concern     Parent/sibling w/ CABG, MI or angioplasty before 65F 55M? No   Social History Narrative     Not on file        FAMILY HISTORY:   Family History   Problem Relation Age of Onset     Cardiovascular Paternal Grandmother 70        heart      Depression Mother         fibromyalgia     Obesity Mother      Depression Father      Anxiety Disorder Father      Autism Spectrum Disorder Father      Attention Deficit Disorder Father      Bipolar Disorder Maternal Grandmother         EXAM:Vitals: /76   Ht 1.892 m (6' 2.5\")   Wt 122.9 kg (271 lb)   BMI 34.33 kg/m      General appearance: Patient is alert " and fully cooperative with history & exam.  No sign of distress is noted during the visit.     Psychiatric: Affect is pleasant & appropriate.  Patient appears motivated to improve health.     Respiratory: Breathing is regular & unlabored while sitting.     HEENT: Hearing is intact to spoken word.  Speech is clear.  No gross evidence of visual impairment that would impact ambulation.     Dermatologic: medial border of the right great toenail.      Vascular: DP & PT pulses are intact & regular bilaterally.  No significant edema or varicosities noted.  CFT and skin temperature is normal to both lower extremities.     Neurologic: Lower extremity sensation is intact to light touch.  No evidence of weakness or contracture in the lower extremities.  No evidence of neuropathy.     Musculoskeletal: Patient is ambulatory without assistive device or brace.  No gross ankle deformity noted.  No foot or ankle joint effusion is noted.     ASSESSMENT: Onychocryptosis     PLAN:  Reviewed [facility] in [facility]. The potential causes and nature of an ingrown toenail were discussed with the patient.  We reviewed the natural history/prognosis of the condition and potential risks if no treatment is provided.      Treatment options discussed included conservative management (oral antibiotics, soaking of foot, adequate width shoes)  as well as surgical management (partial or total nail removal).  The pros and cons of both forms of treatment were reviewed.      After thorough discussion and answering all questions, the patient elected to come back to have the permanent procedure done. Will apply bacitracin and bandage until then.        Valeria Azevedo DPM, Podiatry/Foot and Ankle Surgery    Weight management plan: Patient was referred to their PCP to discuss a diet and exercise plan.    Recommended to Morgan Coleman to follow up with Primary Care provider regarding elevated blood pressure.

## 2019-04-10 NOTE — LETTER
REPORT OF WORK ABILITY    [facility]  [address] [facility] [address]  [phone]    Employee Name: Morgan Coleman        : 1995         Today's date: 4/10/2019    To Whom it May Concern:    Patient was seen in clinic today and will be undergoing a procedure on his foot next week. Please excuse him from work next week. Please call with questions or concerns.               Valeria Azevedo DPM

## 2019-04-16 ENCOUNTER — MYC MEDICAL ADVICE (OUTPATIENT)
Dept: FAMILY MEDICINE | Facility: CLINIC | Age: 24
End: 2019-04-16

## 2019-04-16 ENCOUNTER — TELEPHONE (OUTPATIENT)
Dept: FAMILY MEDICINE | Facility: CLINIC | Age: 24
End: 2019-04-16

## 2019-04-16 ENCOUNTER — OFFICE VISIT (OUTPATIENT)
Dept: PODIATRY | Facility: CLINIC | Age: 24
End: 2019-04-16
Payer: COMMERCIAL

## 2019-04-16 VITALS
WEIGHT: 271 LBS | SYSTOLIC BLOOD PRESSURE: 120 MMHG | DIASTOLIC BLOOD PRESSURE: 70 MMHG | BODY MASS INDEX: 33.69 KG/M2 | HEIGHT: 75 IN

## 2019-04-16 DIAGNOSIS — L60.0 INGROWN NAIL OF GREAT TOE OF RIGHT FOOT: ICD-10-CM

## 2019-04-16 DIAGNOSIS — M79.671 RIGHT FOOT PAIN: Primary | ICD-10-CM

## 2019-04-16 PROCEDURE — 11750 EXCISION NAIL&NAIL MATRIX: CPT | Mod: T5 | Performed by: PODIATRIST

## 2019-04-16 RX ORDER — SILVER SULFADIAZINE 10 MG/G
CREAM TOPICAL 2 TIMES DAILY
Qty: 25 G | Refills: 0 | Status: SHIPPED | OUTPATIENT
Start: 2019-04-16 | End: 2019-08-06

## 2019-04-16 ASSESSMENT — MIFFLIN-ST. JEOR: SCORE: 2306.94

## 2019-04-16 NOTE — LETTER
"    4/16/2019         RE: Morgan Coleman  [address] [facility] [address]        Dear Colleague,    Thank you for referring your patient, Morgan Coleman, to the [facility]. Please see a copy of my visit note below.    Podiatry / Foot and Ankle Surgery Progress Note    April 16, 2019    Subject: Patient was seen for follow up on painful ingrown nail.  Would like permanent procedure today.     Objective:  Vitals: Ht 1.892 m (6' 2.5\")   Wt 122.9 kg (271 lb)   BMI 34.33 kg/m     BMI= Body mass index is 34.33 kg/m .    General:  Patient is alert and orientated.  NAD    Dermatologic: medial border of the right great toenail.      Vascular: DP & PT pulses are intact & regular bilaterally.  No significant edema or varicosities noted.  CFT and skin temperature is normal to both lower extremities.     Neurologic: Lower extremity sensation is intact to light touch.  No evidence of weakness or contracture in the lower extremities.  No evidence of neuropathy.     Musculoskeletal: Patient is ambulatory without assistive device or brace.  No gross ankle deformity noted.  No foot or ankle joint effusion is noted.     ASSESSMENT: Onychocryptosis     PLAN:  Reviewed patient's chart in [facility]. The potential causes and nature of an ingrown toenail were discussed with the patient.  We reviewed the natural history/prognosis of the condition and potential risks if no treatment is provided.      Treatment options discussed included conservative management (oral antibiotics, soaking of foot, adequate width shoes)  as well as surgical management (partial or total nail removal).  The pros and cons of both forms of treatment were reviewed.      Will have permanent procedure done. Will soak the foot 2x a day for 6 weeks. Apply silvadene cream and bandage.    Procedure: After verbal consent, the right big toe was anesthetized with 5cc's of 1% lidocaine plain. A tourniquet was applied to the toe. The medial border " was then raised from the nail bed and then cut the length of the nail.  The offending nail border was then removed.  Three 30 second applications of phenol were applied to the nail bed and nail matrix.  The area was then flushed with copious amounts of alcohol.  Bacitracin was applied to the nail bed.  The tourniquet was removed.  Bandage was applied to the toe.  The patient tolerated the procedure and anesthesia well.      Valeria Azevedo DPM, Podiatry/Foot and Ankle Surgery    Weight management plan: Patient was referred to their PCP to discuss a diet and exercise plan.    Recommended to Morgan Coleman to follow up with Primary Care provider regarding elevated blood pressure.      Again, thank you for allowing me to participate in the care of your patient.        Sincerely,        Valeria Azevedo DPM, Podiatry/Foot and Ankle Surgery

## 2019-04-16 NOTE — PATIENT INSTRUCTIONS
Thank you for choosing [facility]!    DR. JACKSON'S CLINIC SCHEDULE  MONDAY AM - [place] TUESDAY - [place]   [address] [address] [address] [address] [place] [address]   [phone] / [phone] / [phone]       WEDNESDAY - [place] FRIDAY AM - WOUND CENTER   [address]   [address] [address] [place] [address]   [phone] / [phone]       FRIDAY PM - [place] SCHEDULE SURGERY: [phone]   [address] BILLING QUESTIONS: [phone]   [address] [address] AFTER HOURS: [phone] / [phone] APPOINTMENTS: [phone]     Consumer Price Line (CPL) [phone]       INGROWN TOENAILS  When a toenail is ingrown, it is curved and grows into the skin, usually at the nail borders (the sides of the nail). This  digging in  of the nail irritates the skin, often creating pain, redness, swelling, and warmth in the toe.  If an ingrown nail causes a break in the skin, bacteria may enter and cause an infection in the area, which is often marked by drainage and a foul odor. However, even if the toe isn t painful, red, swollen, or warm, a nail that curves downward into the skin can progress to an infection.  CAUSES:  Heredity: In many people, the tendency for ingrown toenails is inherited.   Trauma: Sometimes an ingrown toenail is the result of trauma, such as stubbing your toe, having an object fall on your toe, or engaging in activities that involve repeated pressure on the toes, such as kicking or running.   Improper Trimming:  The most common cause of ingrown toenails is cutting your nails too short. This encourages the skin next to the nail to fold over the nail.   Improperly Sized Footwear: Ingrown toenails can result from wearing socks and shoes that are tight or short.   Nail Conditions: Ingrown toenails can be caused by nail problems, such as fungal  infections or losing a nail due to trauma.   TREATMENT: Sometimes initial treatment for ingrown toenails can be safely performed at home. However, home treatment is strongly discouraged if an infection is suspected, or for those who have medical conditions that put feet at high risk, such as diabetes, nerve damage in the foot, or poor circulation.  Home care: If you don t have an infection or any of the above medical conditions, you can soak your foot in room-temperature water (adding Epsom s salt may be recommended by your doctor), and gently massage the side of the nail fold to help reduce the inflammation.  Avoid attempting  bathroom surgery.  Repeated cutting of the nail can cause the condition to worsen over time. If your symptoms fail to improve, it s time to see a foot and ankle surgeon.  Physician care: After examining the toe, the foot and ankle surgeon will select the treatment best suited for you. If an infection is present, an oral antibiotic may be prescribed.  Sometimes a minor surgical procedure, often performed in the office, will ease the pain and remove the offending nail. After applying a local anesthetic, the doctor removes part of the nail s side border. Some nails may become ingrown again, requiring removal of the nail root.  Following the nail procedure, a light bandage will be applied. Most people experience very little pain after surgery and may resume normal activity the next day. If your surgeon has prescribed an oral antibiotic, be sure to take all the medication, even if your symptoms have improved.  PREVENTION:  Proper Trimming: Cut toenails in a fairly straight line, and don t cut them too short. You should be able to get your fingernail under the sides and end of the nail.   Well-fitting Footwear: Don t wear shoes that are short or tight in the toe area. Avoid shoes that are loose, because they too cause pressure on the toes, especially when running or walking briskly.     INGROWN  TOENAIL REMOVAL AFTERCARE     Go directly home and elevate the affected foot on one or two pillows for the remainder of the day/evening if possible. Your toe may stay numb anywhere from 2-8 hours.     Take Tylenol, ibuprofen or another anti-inflammatory as needed for pain.     Take antibiotic if that has been prescribed. Finish the entire prescribed antibiotic even if your symptoms have improved.     The evening of the procedure, soak/wash the affected area in warm water (you may add Epsom salt) for 5 to 10 minutes. Do this twice a day for 2-4 weeks (6-8 weeks if you had phenol) (you may count showering/bathing as one soak).  After soaks, pat the area dry and then allow to airdry for a few minutes. Apply antibiotic ointment to the area and cover with 2 X 2 gauze and paper tape or band-aid.    You may pursue everyday activities as tolerated with either an open toe shoe or cut-out shoe as needed or you may wear regular shoes if no pain is noted.    Watch for any signs and symptoms of infection such as: redness, red streaks going up the foot/leg, swelling, pus or foul odor. Those that have had the phenol procedure, the toe will drain longer and will look like it is infected because it is a chemical burn.     Please call with questions.        BODY WEIGHT AND YOUR FEET  The following information is included in the after visit summary for all patients. Body weight can be a sensitive issue to discuss in clinic, but we think the following information is very important. Although we focus on the feet and ankles, we do support the overall health of our patients.     Many things can cause foot and ankle problems. Foot structure, activity level, foot mechanics and injuries are common causes of pain. One very important issue that often goes unmentioned, is body weight. Extra weight can cause increased stress on muscles, ligaments, bones and tendons. Sometimes just a few extra pounds is all it takes to put one over her/his  threshold. Without reducing that stress, it can be difficult to alleviate pain. As Foot & Ankle specialists, our job is addressing the lower extremity problem and possible causes. Regarding extra body weight, we encourage patients to discuss diet and weight management plans with their primary care doctors. It is this team approach that gives you the best opportunity for pain relief and getting you back on your feet.      [facility] has a Comprehensive Weight Management Program. This program includes counseling, education, non-surgical and surgical approaches to weight loss. If you are interested in learning more either talk to you primary care provider or call [phone].

## 2019-04-16 NOTE — TELEPHONE ENCOUNTER
Type of outreach:  Sent [email or web address] message.  Health Maintenance Due   Topic Date Due     HIV SCREEN (SYSTEM ASSIGNED)  12/03/2014     Due to update PHQ/ISAMAR.  Carlitos Del Cid CMA ([facility])

## 2019-04-16 NOTE — PROGRESS NOTES
"Podiatry / Foot and Ankle Surgery Progress Note    April 16, 2019    Subject: Patient was seen for follow up on painful ingrown nail.  Would like permanent procedure today.     Objective:  Vitals: Ht 1.892 m (6' 2.5\")   Wt 122.9 kg (271 lb)   BMI 34.33 kg/m    BMI= Body mass index is 34.33 kg/m .    General:  Patient is alert and orientated.  NAD    Dermatologic: medial border of the right great toenail.      Vascular: DP & PT pulses are intact & regular bilaterally.  No significant edema or varicosities noted.  CFT and skin temperature is normal to both lower extremities.     Neurologic: Lower extremity sensation is intact to light touch.  No evidence of weakness or contracture in the lower extremities.  No evidence of neuropathy.     Musculoskeletal: Patient is ambulatory without assistive device or brace.  No gross ankle deformity noted.  No foot or ankle joint effusion is noted.     ASSESSMENT: Onychocryptosis     PLAN:  Reviewed patient's chart in [facility]. The potential causes and nature of an ingrown toenail were discussed with the patient.  We reviewed the natural history/prognosis of the condition and potential risks if no treatment is provided.      Treatment options discussed included conservative management (oral antibiotics, soaking of foot, adequate width shoes)  as well as surgical management (partial or total nail removal).  The pros and cons of both forms of treatment were reviewed.      Will have permanent procedure done. Will soak the foot 2x a day for 6 weeks. Apply silvadene cream and bandage.    Procedure: After verbal consent, the right big toe was anesthetized with 5cc's of 1% lidocaine plain. A tourniquet was applied to the toe. The medial border was then raised from the nail bed and then cut the length of the nail.  The offending nail border was then removed.  Three 30 second applications of phenol were applied to the nail bed and nail matrix.  The area was then flushed with copious amounts " of alcohol.  Bacitracin was applied to the nail bed.  The tourniquet was removed.  Bandage was applied to the toe.  The patient tolerated the procedure and anesthesia well.      Valeria Azevedo DPM, Podiatry/Foot and Ankle Surgery    Weight management plan: Patient was referred to their PCP to discuss a diet and exercise plan.    Recommended to Morgan Coleman to follow up with Primary Care provider regarding elevated blood pressure.

## 2019-04-23 NOTE — TELEPHONE ENCOUNTER
2nd attempt, LM for patient to call back.  Needs OV around 5/20/19 for mood follow-up.  Carlitos Del Cid CMA ([facility])

## 2019-04-30 NOTE — TELEPHONE ENCOUNTER
3rd attempt, LM for patient to call back.  Needs OV in 2-3 weeks for mood follow-up.  Carlitos Del Cid CMA ([facility])

## 2019-05-08 NOTE — PATIENT INSTRUCTIONS
Would want to see you back before need next refill- 1-2 mos depending on how you end up taking this.      standing/actual

## 2019-07-17 ENCOUNTER — TELEPHONE (OUTPATIENT)
Dept: FAMILY MEDICINE | Facility: CLINIC | Age: 24
End: 2019-07-17

## 2019-07-17 ENCOUNTER — MYC MEDICAL ADVICE (OUTPATIENT)
Dept: FAMILY MEDICINE | Facility: CLINIC | Age: 24
End: 2019-07-17

## 2019-07-17 NOTE — LETTER
July 31, 2019      Morgan Coleman  [id number] [facility] [id number]        Dear Mr. Morgan Coleman,      We care about your health and have reviewed your health plan including medical conditions, medications, and lab results.  Based on this review, we recommend you take the following action(s):     -schedule an OFFICE VISIT for a mental health follow-up.  This will help us address your specific concerns and determine the best health recommendations for you.          Please use [facility] or call us at [phone] to address the above recommendations.   Thank you for trusting [facility] and we appreciate the opportunity to serve you.  We look forward to supporting your healthcare needs in the future.       Sincerely,  Carlitos Del Cid CMA ([facility])

## 2019-07-17 NOTE — TELEPHONE ENCOUNTER
Type of outreach:  Sent [employer] message.  Health Maintenance Due   Topic Date Due     HIV SCREENING  12/03/2011     PHQ-9  06/21/2019     Needs mood recheck per PCP.  Carlitos Del Cid CMA ([facility])

## 2019-07-31 NOTE — TELEPHONE ENCOUNTER
3rd attempt, LM for patient to call back.  Also sent letter in mail.  Carlitos Del Cid CMA ([facility])

## 2019-08-06 ENCOUNTER — OFFICE VISIT (OUTPATIENT)
Dept: FAMILY MEDICINE | Facility: CLINIC | Age: 24
End: 2019-08-06
Payer: COMMERCIAL

## 2019-08-06 VITALS
SYSTOLIC BLOOD PRESSURE: 116 MMHG | OXYGEN SATURATION: 95 % | DIASTOLIC BLOOD PRESSURE: 70 MMHG | TEMPERATURE: 98.1 F | WEIGHT: 279.2 LBS | BODY MASS INDEX: 34.71 KG/M2 | HEIGHT: 75 IN | RESPIRATION RATE: 16 BRPM | HEART RATE: 89 BPM

## 2019-08-06 DIAGNOSIS — F90.0 ADHD, PREDOMINANTLY INATTENTIVE TYPE: ICD-10-CM

## 2019-08-06 DIAGNOSIS — F41.8 MIXED ANXIETY DEPRESSIVE DISORDER: Primary | ICD-10-CM

## 2019-08-06 PROCEDURE — 99213 OFFICE O/P EST LOW 20 MIN: CPT | Performed by: PHYSICIAN ASSISTANT

## 2019-08-06 RX ORDER — FLUOXETINE 10 MG/1
CAPSULE ORAL
Qty: 30 CAPSULE | Refills: 1 | Status: SHIPPED | OUTPATIENT
Start: 2019-08-06 | End: 2019-09-03

## 2019-08-06 RX ORDER — ATOMOXETINE 40 MG/1
40 CAPSULE ORAL DAILY
Qty: 30 CAPSULE | Refills: 1 | Status: SHIPPED | OUTPATIENT
Start: 2019-08-06 | End: 2019-09-03

## 2019-08-06 ASSESSMENT — ANXIETY QUESTIONNAIRES
GAD7 TOTAL SCORE: 15
3. WORRYING TOO MUCH ABOUT DIFFERENT THINGS: NEARLY EVERY DAY
2. NOT BEING ABLE TO STOP OR CONTROL WORRYING: MORE THAN HALF THE DAYS
5. BEING SO RESTLESS THAT IT IS HARD TO SIT STILL: MORE THAN HALF THE DAYS
7. FEELING AFRAID AS IF SOMETHING AWFUL MIGHT HAPPEN: MORE THAN HALF THE DAYS
1. FEELING NERVOUS, ANXIOUS, OR ON EDGE: MORE THAN HALF THE DAYS
IF YOU CHECKED OFF ANY PROBLEMS ON THIS QUESTIONNAIRE, HOW DIFFICULT HAVE THESE PROBLEMS MADE IT FOR YOU TO DO YOUR WORK, TAKE CARE OF THINGS AT HOME, OR GET ALONG WITH OTHER PEOPLE: VERY DIFFICULT
6. BECOMING EASILY ANNOYED OR IRRITABLE: NEARLY EVERY DAY

## 2019-08-06 ASSESSMENT — MIFFLIN-ST. JEOR: SCORE: 2344.13

## 2019-08-06 ASSESSMENT — PATIENT HEALTH QUESTIONNAIRE - PHQ9
5. POOR APPETITE OR OVEREATING: SEVERAL DAYS
SUM OF ALL RESPONSES TO PHQ QUESTIONS 1-9: 18

## 2019-08-06 NOTE — PROGRESS NOTES
"Subjective     Morgan Coleman is a 23 year old male who presents to clinic today for the following health issues:    HPI   1. Medication Followup of Prozac    Taking Medication as prescribed: NO-patient only was taking for a couple months then stopped taking    Side Effects:  None    Medication Helping Symptoms:  Patient thinks it may have helped    Patient says he does not want to take the meds because he forgets to take them all the time, feels like his house and his head is a mess and doesn't see the point in taking his meds if he can't ever find them  If frustrated with his house being a mess    He notes he is particularly bummed because he did something \"untrustworthy\" in a social situation  This has happened before  Is going to restart counseling, hasn't been in about 1 year  No SI/HI  Coping with poor mood using video games, no drugs or alcohol  He would like to restart his Prozac       2. Med Request  Patient is wondering about going on an ADHD med again to help him be more focused  Was on Adderall but had bad side effects while on it (elevated heart rate, says he was pasty white)  Would like non stimulant version        Patient Active Problem List   Diagnosis     Learning disability     Language disorder involving understanding and expression of language     ADHD, predominantly inattentive type     Seasonal allergic rhinitis     Tinnitus     Auditory processing disorder     Vitamin D deficiency     Family history of coronary artery disease, Normal Lipid Panel 2008     [facility]     Class 1 obesity without serious comorbidity with body mass index (BMI) of 34.0 to 34.9 in adult, unspecified obesity type     Mixed anxiety depressive disorder     Active autistic disorder     Past Surgical History:   Procedure Laterality Date     ADENOIDECTOMY  1999     EXCISION OF NAIL FOLD, TOE  12/11    Dr. Haines- right toe     PE TUBES  1997 & 1999    X2     TONSILLECTOMY  4/09       Social History     Tobacco Use     " "Smoking status: Never Smoker     Smokeless tobacco: Never Used   Substance Use Topics     Alcohol use: Yes     Alcohol/week: 0.0 oz     Comment: rarely     Family History   Problem Relation Age of Onset     Cardiovascular Paternal Grandmother 70        heart      Depression Mother         fibromyalgia     Obesity Mother      Depression Father      Anxiety Disorder Father      Autism Spectrum Disorder Father      Attention Deficit Disorder Father      Bipolar Disorder Maternal Grandmother      No Known Problems Maternal Grandfather      No Known Problems Paternal Grandfather          Current Outpatient Medications   Medication Sig Dispense Refill     atomoxetine (STRATTERA) 40 MG capsule Take 1 capsule (40 mg) by mouth daily 30 capsule 1     FLUoxetine (PROZAC) 10 MG capsule TAKE 1 CAPSULE BY MOUTH  DAILY TAKE ALONG WITH 10  MG= 30 MG 30 capsule 1     FLUoxetine (PROZAC) 20 MG capsule Take 1 capsule (20 mg) by mouth daily Recheck before any refills. 30 capsule 1     Allergies   Allergen Reactions     Mites [Dust Mites]      Pollen Extract        Reviewed and updated as needed this visit by Provider         Review of Systems   ROS COMP: Constitutional, HEENT, cardiovascular, pulmonary, gi and gu systems are negative, except as otherwise noted.      Objective    /70 (BP Location: Right arm, Patient Position: Chair, Cuff Size: Adult Large)   Pulse 89   Temp 98.1  F (36.7  C) (Oral)   Resp 16   Ht 1.892 m (6' 2.5\")   Wt 126.6 kg (279 lb 3.2 oz)   SpO2 95%   BMI 35.37 kg/m    Body mass index is 35.37 kg/m .  Physical Exam   GENERAL: healthy, alert and no distress  NECK: no adenopathy, no asymmetry, masses, or scars and thyroid normal to palpation  RESP: lungs clear to auscultation - no rales, rhonchi or wheezes  CV: regular rate and rhythm, normal S1 S2, no S3 or S4, no murmur, click or rub, no peripheral edema and peripheral pulses strong  MS: no gross musculoskeletal defects noted, no edema  PSYCH: " mentation appears normal, affect normal/bright    Diagnostic Test Results:  none         Assessment & Plan     1. Mixed anxiety depressive disorder  Chronic issue, PHQ9/GAD7 updated today.  Very hard for him to discuss what is bothering him.  Will plan to restart Prozac.  Risks, benefits and alternatives were discussed with patient. Agreeable to the plan of care.  - FLUoxetine (PROZAC) 10 MG capsule; TAKE 1 CAPSULE BY MOUTH  DAILY TAKE ALONG WITH 10  MG= 30 MG  Dispense: 30 capsule; Refill: 1  - FLUoxetine (PROZAC) 20 MG capsule; Take 1 capsule (20 mg) by mouth daily Recheck before any refills.  Dispense: 30 capsule; Refill: 1    2. ADHD, predominantly inattentive type  Chronic issue, will trial Strattera.  Recheck in 1 month.  Risks, benefits and alternatives were discussed with patient. Agreeable to the plan of care.  - atomoxetine (STRATTERA) 40 MG capsule; Take 1 capsule (40 mg) by mouth daily  Dispense: 30 capsule; Refill: 1        Risks, benefits and alternatives were discussed with patient. Agreeable to the plan of care.      Return in about 4 weeks (around 9/3/2019) for Mood Recheck.    Saranya Costa PA-C  [facility]

## 2019-08-07 ASSESSMENT — ANXIETY QUESTIONNAIRES: GAD7 TOTAL SCORE: 15

## 2019-09-03 ENCOUNTER — OFFICE VISIT (OUTPATIENT)
Dept: FAMILY MEDICINE | Facility: CLINIC | Age: 24
End: 2019-09-03
Payer: COMMERCIAL

## 2019-09-03 VITALS
HEIGHT: 75 IN | RESPIRATION RATE: 16 BRPM | TEMPERATURE: 98.1 F | BODY MASS INDEX: 34.03 KG/M2 | HEART RATE: 70 BPM | SYSTOLIC BLOOD PRESSURE: 108 MMHG | OXYGEN SATURATION: 97 % | WEIGHT: 273.7 LBS | DIASTOLIC BLOOD PRESSURE: 68 MMHG

## 2019-09-03 DIAGNOSIS — F41.8 MIXED ANXIETY DEPRESSIVE DISORDER: Primary | ICD-10-CM

## 2019-09-03 DIAGNOSIS — F90.0 ADHD, PREDOMINANTLY INATTENTIVE TYPE: ICD-10-CM

## 2019-09-03 PROCEDURE — 99213 OFFICE O/P EST LOW 20 MIN: CPT | Performed by: PHYSICIAN ASSISTANT

## 2019-09-03 RX ORDER — FLUOXETINE 40 MG/1
40 CAPSULE ORAL DAILY
Qty: 90 CAPSULE | Refills: 0 | Status: SHIPPED | OUTPATIENT
Start: 2019-09-03 | End: 2020-01-02

## 2019-09-03 ASSESSMENT — PATIENT HEALTH QUESTIONNAIRE - PHQ9
SUM OF ALL RESPONSES TO PHQ QUESTIONS 1-9: 13
5. POOR APPETITE OR OVEREATING: MORE THAN HALF THE DAYS

## 2019-09-03 ASSESSMENT — ANXIETY QUESTIONNAIRES
1. FEELING NERVOUS, ANXIOUS, OR ON EDGE: MORE THAN HALF THE DAYS
6. BECOMING EASILY ANNOYED OR IRRITABLE: NEARLY EVERY DAY
2. NOT BEING ABLE TO STOP OR CONTROL WORRYING: SEVERAL DAYS
7. FEELING AFRAID AS IF SOMETHING AWFUL MIGHT HAPPEN: SEVERAL DAYS
5. BEING SO RESTLESS THAT IT IS HARD TO SIT STILL: MORE THAN HALF THE DAYS
3. WORRYING TOO MUCH ABOUT DIFFERENT THINGS: SEVERAL DAYS
GAD7 TOTAL SCORE: 12
IF YOU CHECKED OFF ANY PROBLEMS ON THIS QUESTIONNAIRE, HOW DIFFICULT HAVE THESE PROBLEMS MADE IT FOR YOU TO DO YOUR WORK, TAKE CARE OF THINGS AT HOME, OR GET ALONG WITH OTHER PEOPLE: SOMEWHAT DIFFICULT

## 2019-09-03 ASSESSMENT — MIFFLIN-ST. JEOR: SCORE: 2319.19

## 2019-09-03 NOTE — PROGRESS NOTES
"Subjective     Morgan Coleman is a 23 year old male who presents to clinic today for the following health issues:    HPI   Depression and Anxiety Follow-Up     How are you doing with your depression since your last visit? No change-Just the Same    How are you doing with your anxiety since your last visit?  Improved-States prozac has seemed to help with irritability.     Are you having other symptoms that might be associated with depression or anxiety? No    Have you had a significant life event? No     Do you have any concerns with your use of alcohol or other drugs? No    Social History     Tobacco Use     Smoking status: Never Smoker     Smokeless tobacco: Never Used   Substance Use Topics     Alcohol use: Yes     Alcohol/week: 0.0 oz     Comment: rarely     Drug use: No     PHQ 12/21/2018 8/6/2019 9/3/2019   PHQ-9 Total Score 18 18 13   Q9: Thoughts of better off dead/self-harm past 2 weeks Not at all Not at all Not at all     ISAMAR-7 SCORE 12/21/2018 8/6/2019 9/3/2019   Total Score - - -   Total Score 11 15 12     Suicide Assessment Five-step Evaluation and Treatment (SAFE-T)      How many servings of fruits and vegetables do you eat daily?  0-1    On average, how many sweetened beverages do you drink each day (soda, juice, sweet tea, etc)?   Multiple     How many days per week do you miss taking your medication? Has missed taking medication last week due to forgetting prescription at home.     Patient presents to follow up after restarting prozac   He does note that it is helping more than he expected  When he takes it he feels less irritable and stressed  Remembering more through the week but occasionally missing on the weekends  Has not noted many side effects  He did not start the 10mg dose of prozac    Strattera seemed to \"dull\" his cognitive ability.  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 Haines- right toe     PE TUBES  1997 & 1999    X2     TONSILLECTOMY  4/09       Social History     Tobacco Use     Smoking status: Never Smoker     Smokeless tobacco: Never Used   Substance Use Topics     Alcohol use: Yes     Alcohol/week: 0.0 oz     Comment: rarely     Family History   Problem Relation Age of Onset     Cardiovascular Paternal Grandmother 70        heart      Depression Mother         fibromyalgia     Obesity Mother      Depression Father      Anxiety Disorder Father      Autism Spectrum Disorder Father      Attention Deficit Disorder Father      Bipolar Disorder Maternal Grandmother      No Known Problems Maternal Grandfather      No Known Problems Paternal Grandfather            Reviewed and updated as needed this visit by Provider         Review of Systems   ROS COMP: Constitutional, HEENT, cardiovascular, pulmonary, gi and gu systems are negative, except as otherwise noted.      Objective    /68 (BP Location: Right arm, Patient Position: Chair, Cuff Size: Adult Large)   Pulse 70   Temp 98.1  F (36.7  C) (Oral)   Resp 16   Ht 1.892 m (6' 2.5\")   Wt 124.1 kg (273 lb 11.2 oz)   SpO2 97%   BMI 34.67 kg/m    Body mass index is 34.67 " "kg/m .  Physical Exam   GENERAL: healthy, alert and no distress  PSYCH: affect normal/bright, judgement and insight impaired and appearance well groomed    Diagnostic Test Results:  Labs reviewed in Epic        Assessment & Plan     1. Mixed anxiety depressive disorder  He has done well with initiation of prozac and compliance. He does feel there is room to improve. Review of his chart does show a tendency to be sensitive to medications and we reviewed simply moving to 30mg but he states he's done well on 40mg before and is confident to move to that dosing. He should follow up in 3 months  - FLUoxetine (PROZAC) 40 MG capsule; Take 1 capsule (40 mg) by mouth daily  Dispense: 90 capsule; Refill: 0    2. ADHD, predominantly inattentive type  Recommend holding on treatment until he can feel more stable with his moods       BMI:   Estimated body mass index is 34.67 kg/m  as calculated from the following:    Height as of this encounter: 1.892 m (6' 2.5\").    Weight as of this encounter: 124.1 kg (273 lb 11.2 oz).       Return in about 3 months (around 12/3/2019) for Medication Check.    Irvin Atwood PA-C  [facility]    "

## 2019-09-04 ASSESSMENT — ANXIETY QUESTIONNAIRES: GAD7 TOTAL SCORE: 12

## 2019-10-24 ENCOUNTER — OFFICE VISIT (OUTPATIENT)
Dept: FAMILY MEDICINE | Facility: CLINIC | Age: 24
End: 2019-10-24
Payer: COMMERCIAL

## 2019-10-24 VITALS
HEART RATE: 68 BPM | SYSTOLIC BLOOD PRESSURE: 122 MMHG | HEIGHT: 75 IN | OXYGEN SATURATION: 95 % | DIASTOLIC BLOOD PRESSURE: 82 MMHG | BODY MASS INDEX: 34.58 KG/M2 | RESPIRATION RATE: 16 BRPM | TEMPERATURE: 97 F | WEIGHT: 278.1 LBS

## 2019-10-24 DIAGNOSIS — F41.8 MIXED ANXIETY DEPRESSIVE DISORDER: Primary | ICD-10-CM

## 2019-10-24 DIAGNOSIS — F84.0 ACTIVE AUTISTIC DISORDER: ICD-10-CM

## 2019-10-24 DIAGNOSIS — Z23 NEED FOR PROPHYLACTIC VACCINATION AND INOCULATION AGAINST INFLUENZA: ICD-10-CM

## 2019-10-24 DIAGNOSIS — E66.01 MORBID OBESITY (H): ICD-10-CM

## 2019-10-24 PROCEDURE — 90471 IMMUNIZATION ADMIN: CPT | Performed by: PHYSICIAN ASSISTANT

## 2019-10-24 PROCEDURE — 90686 IIV4 VACC NO PRSV 0.5 ML IM: CPT | Performed by: PHYSICIAN ASSISTANT

## 2019-10-24 PROCEDURE — 99214 OFFICE O/P EST MOD 30 MIN: CPT | Mod: 25 | Performed by: PHYSICIAN ASSISTANT

## 2019-10-24 ASSESSMENT — MIFFLIN-ST. JEOR: SCORE: 2339.14

## 2019-10-24 NOTE — PATIENT INSTRUCTIONS
Try to adopt a diet rich in fruits, vegetables, and lowfat dairy products with special attention to reduced content of saturated and total fat. Lean meat, like poultry and fish, is best. Regarding exercise, a good goal is to aim for regular aerobic physical activity (e.g., brisk walking, running, biking, swimming) at least 30 minutes per day, most days of the week.    I like [email or web address] for healthy meal ideas.

## 2019-10-24 NOTE — PROGRESS NOTES
Subjective     Morgan Coleman is a 23 year old male who presents to clinic today for the following health issues:    HPI     Patient has paperwork to be completed for [facility].  The modifications do seem to help with sensory issues, social skills, emotional regulation etc.  Plan developed with Dick ROSS and Nelson  At his last visit we upped his prozac to 40mg and this has gone well  His moods are stable  He is tolerating the dose  Looking to be more active; would like to reduce weight; improve diet      Patient Active Problem List   Diagnosis     Learning disability     Language disorder involving understanding and expression of language     ADHD, predominantly inattentive type     Seasonal allergic rhinitis     Tinnitus     Auditory processing disorder     Vitamin D deficiency     Family history of coronary artery disease, Normal Lipid Panel 2008     [facility]     Class 1 obesity without serious comorbidity with body mass index (BMI) of 34.0 to 34.9 in adult, unspecified obesity type     Mixed anxiety depressive disorder     Active autistic disorder     Past Surgical History:   Procedure Laterality Date     ADENOIDECTOMY  1999     EXCISION OF NAIL FOLD, TOE  12/11    Dr. Haines- right toe     PE TUBES  1997 & 1999    X2     TONSILLECTOMY  4/09       Social History     Tobacco Use     Smoking status: Never Smoker     Smokeless tobacco: Never Used   Substance Use Topics     Alcohol use: Yes     Alcohol/week: 0.0 standard drinks     Comment: rarely     Family History   Problem Relation Age of Onset     Cardiovascular Paternal Grandmother 70        heart      Depression Mother         fibromyalgia     Obesity Mother      Depression Father      Anxiety Disorder Father      Autism Spectrum Disorder Father      Attention Deficit Disorder Father      Bipolar Disorder Maternal Grandmother      No Known Problems Maternal Grandfather      No Known Problems Paternal Grandfather            Reviewed and updated as needed  "this visit by Provider         Review of Systems   ROS COMP: Constitutional, HEENT, cardiovascular, pulmonary, gi and gu systems are negative, except as otherwise noted.      Objective    /82   Pulse 68   Temp 97  F (36.1  C) (Tympanic)   Resp 16   Ht 1.892 m (6' 2.5\")   Wt 126.1 kg (278 lb 1.6 oz)   SpO2 95%   BMI 35.23 kg/m    Body mass index is 35.23 kg/m .  Physical Exam   GENERAL: healthy, alert and no distress  PSYCH: mentation appears normal, affect normal/bright    Diagnostic Test Results:  none         Assessment & Plan     1. Mixed anxiety depressive disorder  2. Active autistic disorder  3. Morbid obesity (H)  Reviewed moods today which are improved at current dosing. We'll Continue present management and plan for follow up in 6 months. Reviewed weight loss/Healthy lifestyle changes. DID complete M[facility]P form.     4. Need for prophylactic vaccination and inoculation against influenza  - INFLUENZA VACCINE IM > 6 MONTHS VALENT IIV4 [95434]  - Vaccine Administration, Initial [28184]     BMI:   Estimated body mass index is 35.23 kg/m  as calculated from the following:    Height as of this encounter: 1.892 m (6' 2.5\").    Weight as of this encounter: 126.1 kg (278 lb 1.6 oz).   Weight management plan: Discussed healthy diet and exercise guidelines      Return in about 6 months (around 4/24/2020) for Follow up in 6 months for Medication Check.    Irvin Atwood PA-C  [facility]      "

## 2019-12-31 NOTE — PROGRESS NOTES
"Subjective     Morgan Coleman is a 24 year old male who presents to clinic today for the following health issues:    HPI   Medication Followup of Prozac    Taking Medication as prescribed: Yes, but forgets to take 1-3 times per week     Side Effects: None    Medication Helping Symptoms: Yes   Morgan Coleman is a 24 year old male who presents today for medication check  Overall he feels well  He does forget to take his medication a few times per week but does not notice symptoms   -does note he feels a bit more irritable though when not taking  Feels that the meds help with irritation at work; less stressed  Provides him with \"more time to figure out coping mechanisms\"      Patient Active Problem List   Diagnosis     Learning disability     Language disorder involving understanding and expression of language     ADHD, predominantly inattentive type     Seasonal allergic rhinitis     Tinnitus     Auditory processing disorder     Vitamin D deficiency     Family history of coronary artery disease, Normal Lipid Panel 2008     [facility]     Class 1 obesity without serious comorbidity with body mass index (BMI) of 34.0 to 34.9 in adult, unspecified obesity type     Mixed anxiety depressive disorder     Active autistic disorder     Past Surgical History:   Procedure Laterality Date     ADENOIDECTOMY  1999     EXCISION OF NAIL FOLD, TOE  12/11    Dr. Haines- right toe     PE TUBES  1997 & 1999    X2     TONSILLECTOMY  4/09       Social History     Tobacco Use     Smoking status: Never Smoker     Smokeless tobacco: Never Used   Substance Use Topics     Alcohol use: Yes     Alcohol/week: 0.0 standard drinks     Comment: rarely     Family History   Problem Relation Age of Onset     Cardiovascular Paternal Grandmother 70        heart      Depression Mother         fibromyalgia     Obesity Mother      Depression Father      Anxiety Disorder Father      Autism Spectrum Disorder Father      Attention Deficit Disorder Father  " "    Bipolar Disorder Maternal Grandmother      No Known Problems Maternal Grandfather      No Known Problems Paternal Grandfather            Reviewed and updated as needed this visit by Provider         Review of Systems   ROS COMP: Constitutional, HEENT, cardiovascular, pulmonary, gi and gu systems are negative, except as otherwise noted.      Objective    /70   Pulse 84   Temp 97  F (36.1  C) (Tympanic)   Resp 16   Ht 1.892 m (6' 2.5\")   Wt 128.1 kg (282 lb 6.4 oz)   SpO2 96%   BMI 35.77 kg/m    Body mass index is 35.77 kg/m .  Physical Exam   GENERAL: healthy, alert and no distress  PSYCH: mentation appears normal, affect normal    Diagnostic Test Results:  Labs reviewed in Epic        Assessment & Plan     1. Mixed anxiety depressive disorder  He continues to express improvement while on the dose and medication below. Continue present management. Follow up 6 months  - FLUoxetine (PROZAC) 40 MG capsule; Take 1 capsule (40 mg) by mouth daily  Dispense: 90 capsule; Refill: 1       Return in about 6 months (around 7/2/2020) for Follow up in 6 months for Medication Check.    Irvin Atwood PA-C  [facility]      "

## 2020-01-02 ENCOUNTER — OFFICE VISIT (OUTPATIENT)
Dept: FAMILY MEDICINE | Facility: CLINIC | Age: 25
End: 2020-01-02
Payer: COMMERCIAL

## 2020-01-02 VITALS
DIASTOLIC BLOOD PRESSURE: 70 MMHG | HEIGHT: 75 IN | RESPIRATION RATE: 16 BRPM | TEMPERATURE: 97 F | BODY MASS INDEX: 35.11 KG/M2 | WEIGHT: 282.4 LBS | OXYGEN SATURATION: 96 % | HEART RATE: 84 BPM | SYSTOLIC BLOOD PRESSURE: 120 MMHG

## 2020-01-02 DIAGNOSIS — F41.8 MIXED ANXIETY DEPRESSIVE DISORDER: ICD-10-CM

## 2020-01-02 PROCEDURE — 99213 OFFICE O/P EST LOW 20 MIN: CPT | Performed by: PHYSICIAN ASSISTANT

## 2020-01-02 RX ORDER — FLUOXETINE 40 MG/1
40 CAPSULE ORAL DAILY
Qty: 90 CAPSULE | Refills: 1 | Status: SHIPPED | OUTPATIENT
Start: 2020-01-02 | End: 2020-01-23

## 2020-01-02 ASSESSMENT — ANXIETY QUESTIONNAIRES
2. NOT BEING ABLE TO STOP OR CONTROL WORRYING: SEVERAL DAYS
3. WORRYING TOO MUCH ABOUT DIFFERENT THINGS: SEVERAL DAYS
7. FEELING AFRAID AS IF SOMETHING AWFUL MIGHT HAPPEN: NOT AT ALL
IF YOU CHECKED OFF ANY PROBLEMS ON THIS QUESTIONNAIRE, HOW DIFFICULT HAVE THESE PROBLEMS MADE IT FOR YOU TO DO YOUR WORK, TAKE CARE OF THINGS AT HOME, OR GET ALONG WITH OTHER PEOPLE: VERY DIFFICULT
1. FEELING NERVOUS, ANXIOUS, OR ON EDGE: MORE THAN HALF THE DAYS
6. BECOMING EASILY ANNOYED OR IRRITABLE: NEARLY EVERY DAY
5. BEING SO RESTLESS THAT IT IS HARD TO SIT STILL: NOT AT ALL
GAD7 TOTAL SCORE: 7

## 2020-01-02 ASSESSMENT — PATIENT HEALTH QUESTIONNAIRE - PHQ9
5. POOR APPETITE OR OVEREATING: NOT AT ALL
SUM OF ALL RESPONSES TO PHQ QUESTIONS 1-9: 15

## 2020-01-02 ASSESSMENT — MIFFLIN-ST. JEOR: SCORE: 2353.65

## 2020-01-03 ASSESSMENT — ANXIETY QUESTIONNAIRES: GAD7 TOTAL SCORE: 7

## 2020-01-23 DIAGNOSIS — F41.8 MIXED ANXIETY DEPRESSIVE DISORDER: ICD-10-CM

## 2020-01-23 RX ORDER — FLUOXETINE 40 MG/1
40 CAPSULE ORAL DAILY
Qty: 90 CAPSULE | Refills: 1 | Status: SHIPPED | OUTPATIENT
Start: 2020-01-23

## 2020-01-23 NOTE — TELEPHONE ENCOUNTER
Sent prescription 1/2/2020 Prozac 40 mg to new mail order pharmacy per patient request.    Jyothi Castillo RN

## 2020-01-23 NOTE — TELEPHONE ENCOUNTER
Please send refill through to new pharmacy. The pt hasn't picked up the one that was sent on 1/2/20.   Jennifer Beard on 1/23/2020 at 2:48 PM

## 2020-02-16 ENCOUNTER — HEALTH MAINTENANCE LETTER (OUTPATIENT)
Age: 25
End: 2020-02-16

## 2020-02-24 ENCOUNTER — OFFICE VISIT (OUTPATIENT)
Dept: FAMILY MEDICINE | Facility: CLINIC | Age: 25
End: 2020-02-24
Payer: COMMERCIAL

## 2020-02-24 VITALS
TEMPERATURE: 98.2 F | RESPIRATION RATE: 17 BRPM | DIASTOLIC BLOOD PRESSURE: 60 MMHG | SYSTOLIC BLOOD PRESSURE: 118 MMHG | BODY MASS INDEX: 34.04 KG/M2 | OXYGEN SATURATION: 95 % | HEART RATE: 87 BPM | WEIGHT: 273.8 LBS | HEIGHT: 75 IN

## 2020-02-24 DIAGNOSIS — M26.30: ICD-10-CM

## 2020-02-24 DIAGNOSIS — Z01.818 PREOP GENERAL PHYSICAL EXAM: Primary | ICD-10-CM

## 2020-02-24 LAB — HGB BLD-MCNC: 17.2 G/DL (ref 13.3–17.7)

## 2020-02-24 PROCEDURE — 36415 COLL VENOUS BLD VENIPUNCTURE: CPT | Performed by: PHYSICIAN ASSISTANT

## 2020-02-24 PROCEDURE — 99214 OFFICE O/P EST MOD 30 MIN: CPT | Performed by: PHYSICIAN ASSISTANT

## 2020-02-24 PROCEDURE — 85018 HEMOGLOBIN: CPT | Performed by: PHYSICIAN ASSISTANT

## 2020-02-24 ASSESSMENT — MIFFLIN-ST. JEOR: SCORE: 2314.64

## 2020-02-24 NOTE — PATIENT INSTRUCTIONS
For pain, only take tylenol from today until your surgery.  It is OK to take prozac on the day of surgery with a small sip of water      Before Your Surgery      Call your surgeon if there is any change in your health. This includes signs of a cold or flu (such as a sore throat, runny nose, cough, rash or fever).    Do not smoke, drink alcohol or take over the counter medicine (unless your surgeon or primary care doctor tells you to) for the 24 hours before and after surgery.    If you take prescribed drugs: Follow your doctor s orders about which medicines to take and which to stop until after surgery.    Eating and drinking prior to surgery: follow the instructions from your surgeon    Take a shower or bath the night before surgery. Use the soap your surgeon gave you to gently clean your skin. If you do not have soap from your surgeon, use your regular soap. Do not shave or scrub the surgery site.  Wear clean pajamas and have clean sheets on your bed.

## 2020-02-24 NOTE — PROGRESS NOTES
[facility]  [address] [facility] [id number] [phone]  Dept: [phone]    PRE-OP EVALUATION:  Today's date: 2020    Morgan Coleman (: 1995) presents for pre-operative evaluation assessment as requested by Dr. Smith.  He requires evaluation and anesthesia risk assessment prior to undergoing surgery/procedure for treatment of lower left wisdom tooth removal .    Fax number for surgical facility: [phone]  Primary Physician: Saranya Costa/Leonides Atwood  Type of Anesthesia Anticipated: General    Patient has a Health Care Directive or Living Will:  NO    Preop Questions 2020   Who is doing your surgery? MAIA Smith   What are you having done? [place] Tooth Extraction   Date of Surgery/Procedure: 20   Facility or Hospital where procedure/surgery will be performed: [facility]   1.  Do you have a history of Heart attack, stroke, stent, coronary bypass surgery, or other heart surgery? No   2.  Do you ever have any pain or discomfort in your chest? No   3.  Do you have a history of  Heart Failure? No   4.   Are you troubled by shortness of breath when:  walking on a level surface, or up a slight hill, or at night? No   5.  Do you currently have a cold, bronchitis or other respiratory infection? No   6.  Do you have a cough, shortness of breath, or wheezing? No   7.  Do you sometimes get pains in the calves of your legs when you walk? No   8. Do you or anyone in your family have previous history of blood clots? No   9.  Do you or does anyone in your family have a serious bleeding problem such as prolonged bleeding following surgeries or cuts? No   10. Have you ever had problems with anemia or been told to take iron pills? No   11. Have you had any abnormal blood loss such as black, tarry or bloody stools? No   12. Have you ever had a blood transfusion? No   13. Have you or any of your relatives ever had problems with anesthesia? No   14.  Do you have sleep apnea, excessive snoring or daytime drowsiness? YES - Snoring and daytime drowsiness;    15. Do you have any prosthetic heart valves? No   16. Do you have prosthetic joints? No         HPI:     HPI related to upcoming procedure: Patient has noted increasing pain and irritation with abnormal wisdom tooth. Does not think would tolerated lesser anesthesia       See problem list for active medical problems.  Problems all longstanding and stable, except as noted/documented.  See ROS for pertinent symptoms related to these conditions.      MEDICAL HISTORY:     Patient Active Problem List    Diagnosis Date Noted     Active autistic disorder 11/23/2018     Priority: Medium     Mixed anxiety depressive disorder 04/10/2017     Priority: Medium     3/12- 9/12 Prozac  4/17 restarted Prozac       Class 1 obesity without serious comorbidity with body mass index (BMI) of 34.0 to 34.9 in adult, unspecified obesity type 10/09/2014     Priority: Medium     Health Care Home 01/24/2012     Priority: Medium     See note 10/9/14 for care plan - summary of history       Seasonal allergic rhinitis 11/14/2011     Priority: Medium     11/08 Intradermal skin tests + Allergy to dust mites, cats, grass, pollen; sublingual immunotherapy [id number] intermittent use; Dr. [facility] in [place]       Tinnitus 11/14/2011     Priority: Medium     Audiogram and ENT evaluation felt to be within normal 9/08- Dr. Chavarria @ [facility].        Auditory processing disorder 11/14/2011     Priority: Medium     9/02 [facility]       Vitamin D deficiency 11/14/2011     Priority: Medium     Level 27; 8/13: 3/11 Vit D 14, B12 484,  Vit A 0.43, Retinyl Palmitate 0.04, Zinc 71, Ferritin 20, Ceruloplasmin 22, Copper 122, nl CBC, TSH 2.21, Free T3 4.1, Free T4 0.89       Family history of coronary artery disease, Normal Lipid Panel 2008 11/14/2011     Priority: Medium     Chol 151, TG 51, LDL 93, HDL 48       Learning  disability      Priority: Medium     Brain MRI normal 8/06; Auditory Processing Therapy, Attention Training Therapy, Vision Therapy @ [facility]- 26 weeks 12/06       Language disorder involving understanding and expression of language      Priority: Medium     2/05 Dr. Joaquin- Primarily Expressive Language; 8/05 [facility]. Has done speech therapy at [facility]       ADHD, predominantly inattentive type      Priority: Medium     EEG normal 8/06 & 10/07 for Brain training 6/08 (27 sessions)  5/17- Adderall        Past Medical History:   Diagnosis Date     ADHD, predominantly inattentive type      Allergic rhinitis      Anxiety 2/10/2012    Psychologist Aziza Corcoran 11/11; ED visit for suicidal ideation     Auditory processing disorder 11/14/2011     Bronchitis      Elevated TSH 2/25/2013    [place] Thyroid replacement 10/11- Dr Bernal Repeats all normal     Language disorder involving understanding and expression of language      Learning disability      Mild major depression (H) 05/23/2012    3/12 Prozac started; D/C fall 2012- doing well     PDD (pervasive developmental disorder), active      Tinnitus 11/14/2011     Past Surgical History:   Procedure Laterality Date     ADENOIDECTOMY  1999     EXCISION OF NAIL FOLD, TOE  12/11    Dr. Haines- right toe     PE TUBES  1997 & 1999    X2     TONSILLECTOMY  4/09     Current Outpatient Medications   Medication Sig Dispense Refill     FLUoxetine (PROZAC) 40 MG capsule Take 1 capsule (40 mg) by mouth daily 90 capsule 1     OTC products: None, except as noted above    Allergies   Allergen Reactions     Mites [Dust Mites]      Pollen Extract       Latex Allergy: NO    Social History     Tobacco Use     Smoking status: Never Smoker     Smokeless tobacco: Never Used   Substance Use Topics     Alcohol use: Yes     Alcohol/week: 0.0 standard drinks     Comment: rarely     History   Drug Use No       REVIEW OF SYSTEMS:   Constitutional, neuro, ENT, endocrine,  "pulmonary, cardiac, gastrointestinal, genitourinary, musculoskeletal, integument and psychiatric systems are negative, except as otherwise noted.    EXAM:   /60   Pulse 87   Temp 98.2  F (36.8  C) (Oral)   Resp 17   Ht 1.892 m (6' 2.5\")   Wt 124.2 kg (273 lb 12.8 oz)   SpO2 95%   BMI 34.68 kg/m      GENERAL APPEARANCE: healthy, alert and no distress     EYES: EOMI,  PERRL     HENT: ear canals and TM's normal and nose and mouth without ulcers or lesions     NECK: no adenopathy, no asymmetry, masses, or scars and thyroid normal to palpation     RESP: lungs clear to auscultation - no rales, rhonchi or wheezes     CV: regular rates and rhythm, normal S1 S2, no S3 or S4 and no murmur, click or rub     MS: No peripheral edema      SKIN: no suspicious lesions or rashes     PSYCH: mentation appears normal. and affect normal/bright    DIAGNOSTICS:   Hemoglobin: 17.2    IMPRESSION:   Reason for surgery/procedure: [place] tooth extraction  Diagnosis/reason for consult: pre-operative exam    The proposed surgical procedure is considered INTERMEDIATE risk.    REVISED CARDIAC RISK INDEX  The patient has the following serious cardiovascular risks for perioperative complications such as (MI, PE, VFib and 3  AV Block):  No serious cardiac risks  INTERPRETATION: 0 risks: Class I (very low risk - 0.4% complication rate)    The patient has the following additional risks for perioperative complications:  No identified additional risks      ICD-10-CM    1. Preop general physical exam Z01.818 Hemoglobin   2. Abnormal tooth position M26.30 Hemoglobin       RECOMMENDATIONS:       Obstructive Sleep Apnea (or suspected sleep apnea)  Hospital staff are advised to monitor for sleep related oxygen desaturations due to suspicion of NO    --Patient is to take all scheduled medications on the day of surgery    APPROVAL GIVEN to proceed with proposed procedure, without further diagnostic evaluation       Signed Electronically by: Irvin" Morgan Atwood PA-C    Copy of this evaluation report is provided to requesting physician.    [place] Preop Guidelines    Revised Cardiac Risk Index

## 2020-03-25 ENCOUNTER — TELEPHONE (OUTPATIENT)
Dept: FAMILY MEDICINE | Facility: CLINIC | Age: 25
End: 2020-03-25

## 2020-03-25 NOTE — TELEPHONE ENCOUNTER
Found letter at  from 1/17/20 written by Irvin Atwood.  Was not picked up by patient, sent letter in mail.  Carlitos Del Cid CMA ([facility])

## 2020-11-22 ENCOUNTER — HEALTH MAINTENANCE LETTER (OUTPATIENT)
Age: 25
End: 2020-11-22

## 2020-11-24 ENCOUNTER — TELEPHONE (OUTPATIENT)
Dept: FAMILY MEDICINE | Facility: CLINIC | Age: 25
End: 2020-11-24

## 2020-11-24 NOTE — TELEPHONE ENCOUNTER
Form emailed to Mando Westfall,  for the patient through [facility].  -Sanjana Maxwell

## 2020-11-24 NOTE — TELEPHONE ENCOUNTER
Reason for call:  Other   Patient called regarding (reason for call): Ada (Mom) checking on the status forms.     Additional comments: [facility] forms [facility]  [place] [facility] to [facility] by Leonides Atwood early last week.  They need to be completed and faxed back ASAP. Please call the Pt to let them know what is going on. Mom stressed that these forms need to be sent back ASAP.       Phone number to reach patient:  Cell number on file:    Telephone Information:   Mobile [phone]       Best Time:  any    Can we leave a detailed message on this number?  YES    Travel screening: Not Applicable     Ninoska Willis on 11/24/2020 at 10:45 AM

## 2021-04-10 ENCOUNTER — HEALTH MAINTENANCE LETTER (OUTPATIENT)
Age: 26
End: 2021-04-10

## 2021-07-27 ENCOUNTER — TELEPHONE (OUTPATIENT)
Dept: FAMILY MEDICINE | Facility: CLINIC | Age: 26
End: 2021-07-27

## 2021-07-27 NOTE — TELEPHONE ENCOUNTER
Patient Quality Outreach      Summary:    Patient has the following on his problem list/HM:   Depression / Dysthymia review    6 Month Remission: 4-8 month window range:    12 Month Remission: 10-14 month window range:         PHQ-9 SCORE 8/6/2019 9/3/2019 1/2/2020   PHQ-9 Total Score - - -   PHQ-9 Total Score MyChart - - -   PHQ-9 Total Score 18 13 15       If PHQ-9 recheck is 5 or more, route to provider for next steps.    Patient is due/failing the following:   PHQ-9 Needed    Type of outreach:    Sent [employer] message.    Questions for provider review:    None                                                                                                                                     Amelia Stockton CMA       Chart routed to Care Team.

## 2021-07-27 NOTE — LETTER
[facility]  [address] [facility] [id number] [phone]       August 20, 2021    Morgan Coleman  [address] [place] [id number]    Dear Morgan,    We care about your health and have reviewed your health plan and are making recommendations based on this review, to optimize your health.    You are in particular need of attention regarding:  -Depression    We are recommending that you: complete the attached questionnaire and send back.       In addition, here is a list of due or overdue Health Maintenance reminders.    Health Maintenance Due   Topic Date Due     ANNUAL REVIEW OF HM ORDERS  Never done     Discuss Advance Care Planning  Never done     HPV Vaccine (1 - Male 2-dose series) Never done     COVID-19 Vaccine (1) Never done     HIV Screening  Never done     Hepatitis C Screening  Never done     Preventive Care Visit  12/21/2019     Anxiety Assessment  07/02/2020     Depression Assessment  07/02/2020       To address the above recommendations, we encourage you to contact us at [phone], via [employer] or by contacting Central Scheduling toll free at [phone] 24 hours a day. They will assist you with finding the most convenient time and location.    Thank you for trusting [facility] and we appreciate the opportunity to serve you.  We look forward to supporting your healthcare needs in the future.    Healthy Regards,    Your [facility] Team

## 2021-08-20 NOTE — TELEPHONE ENCOUNTER
Patient Quality Outreach 2nd Attempt      Summary:    Type of outreach:    Sent letter.    Next Steps:  Reach out within 90 days via Phone.    Max number of attempts reached: Yes. Will try again in 90 days if patient still on fail list.    Questions for provider review:    None                                                                                                                    Amelia Stockton CMA       Chart routed to closed.

## 2021-09-19 ENCOUNTER — HEALTH MAINTENANCE LETTER (OUTPATIENT)
Age: 26
End: 2021-09-19

## 2022-01-07 ENCOUNTER — TELEPHONE (OUTPATIENT)
Dept: FAMILY MEDICINE | Facility: CLINIC | Age: 27
End: 2022-01-07
Payer: COMMERCIAL

## 2022-01-07 NOTE — TELEPHONE ENCOUNTER
Patient Quality Outreach    Patient is due for the following:   Depression  -  PHQ-9 Needed    NEXT STEPS:   complete Questionnaires    Type of outreach:    Sent [employer] message.      Questions for provider review:    None     Amelia Stockton  Chart routed to Care Team.

## 2022-01-07 NOTE — LETTER
January 21, 2022      Morgan Coleman  [address] [facility] [id number]        Dear Mr. Morgan Coleman,      Your healthcare team cares about your health. To provide you with the best care,   we have reviewed your chart and based on our findings, we see that you are due to:     - FOLLOW UP: Complete the attached questionnaires to ensure your mental health needs are being properly met.  Please fill them out and send back to us via [employer], and feel free to call us with any questions or concerns at [phone].      If you have already completed these items, please contact the clinic via phone or   [employer] so your care team can review and update your records. Thank you for   choosing [facility] for your healthcare needs. For any questions,   concerns, or to schedule an appointment please contact the clinic.       Healthy Regards,      Your [facility] Care Team          Sincerely,     Irvin Atwood PA-C

## 2022-01-21 NOTE — TELEPHONE ENCOUNTER
Patient Quality Outreach    Patient is due for the following:   Depression  -  PHQ-9 Needed      Type of outreach:    Sent letter.    Next Steps:  Reach out within 90 days via Letter.    Max number of attempts reached: Yes. Will try again in 90 days if patient still on fail list.    Questions for provider review:    None     Shiresa H. Ormond  Chart routed to Care Team.

## 2022-05-01 ENCOUNTER — HEALTH MAINTENANCE LETTER (OUTPATIENT)
Age: 27
End: 2022-05-01

## 2022-11-21 ENCOUNTER — HEALTH MAINTENANCE LETTER (OUTPATIENT)
Age: 27
End: 2022-11-21

## 2023-07-08 ENCOUNTER — HEALTH MAINTENANCE LETTER (OUTPATIENT)
Age: 28
End: 2023-07-08